# Patient Record
Sex: MALE | Race: WHITE | NOT HISPANIC OR LATINO | Employment: FULL TIME | URBAN - METROPOLITAN AREA
[De-identification: names, ages, dates, MRNs, and addresses within clinical notes are randomized per-mention and may not be internally consistent; named-entity substitution may affect disease eponyms.]

---

## 2017-01-17 ENCOUNTER — ALLSCRIPTS OFFICE VISIT (OUTPATIENT)
Dept: OTHER | Facility: OTHER | Age: 57
End: 2017-01-17

## 2017-03-27 ENCOUNTER — ALLSCRIPTS OFFICE VISIT (OUTPATIENT)
Dept: OTHER | Facility: OTHER | Age: 57
End: 2017-03-27

## 2017-03-31 ENCOUNTER — GENERIC CONVERSION - ENCOUNTER (OUTPATIENT)
Dept: OTHER | Facility: OTHER | Age: 57
End: 2017-03-31

## 2017-03-31 LAB
CHOLEST SERPL-MCNC: 172 MG/DL
GLUCOSE SERPL-MCNC: 81 MG/DL
HBA1C MFR BLD HPLC: 5.4 %
HDLC SERPL-MCNC: 57 MG/DL
LDLC SERPL CALC-MCNC: 103 MG/DL
TRIGL SERPL-MCNC: 60 MG/DL

## 2017-04-05 ENCOUNTER — ALLSCRIPTS OFFICE VISIT (OUTPATIENT)
Dept: OTHER | Facility: OTHER | Age: 57
End: 2017-04-05

## 2017-09-25 ENCOUNTER — ALLSCRIPTS OFFICE VISIT (OUTPATIENT)
Dept: OTHER | Facility: OTHER | Age: 57
End: 2017-09-25

## 2018-01-11 NOTE — PROGRESS NOTES
Assessment   1  Encounter for preventive health examination (V70 0) (Z00 00)  2  Adult BMI 25 0-25 9 kg/sq m (V85 21) (Z68 25)1      1 Amended By: Giovanna Myers; Jan 17 2017 9:20 AM EST    Plan  Health Maintenance    · Follow-up visit in 1 year Evaluation and Treatment  Follow-up  Status: Hold For -  Scheduling  Requested for: [id number]   · Always use a seat belt and shoulder strap when riding or driving a motor vehicle ;  Status:Complete;   Done: 86MAH5278 09:05AM   · Use a sun block product with an SPF of 15 or more ; Status:Complete;   Done:  70SNT0282 09:05AM   · We recommend that you follow the "Mediterranean diet "; Status:Complete;   Done:  92MEM5623 09:05AM    Discussion/Summary  Impression: health maintenance visit  Currently, he eats a healthy diet  Prostate cancer screening: prostate cancer screening is current  Testicular cancer screening: monthly self testicular exam was advised  Colorectal cancer screening: colorectal cancer screening is current  Screening lab work includes glucose and lipid profile  The immunizations are up to date  Advice and education were given regarding nutrition, aerobic exercise, weight bearing exercise, sunscreen use and seat belt use  Chief Complaint  Patient presents for CPE (dli)      History of Present Illness  HM, Adult Male: The patient is being seen for a health maintenance evaluation  The last health maintenance visit was 12 month(s) ago  Social History: Household members include spouse and adult children  He is   Work status: working full time  The patient has never smoked cigarettes  He reports rare alcohol use  He has never used illicit drugs  General Health: The patient's health since the last visit is described as good  He has regular dental visits  He denies vision problems  He denies hearing loss  Immunizations status: up to date  Lifestyle:  He consumes a diverse and healthy diet  He does not have any weight concerns  He exercises regularly  He does not use tobacco  He denies drug use  Screening: Prostate cancer screening includes prostate-specific antigen testing last year  Testicular cancer screening includes monthly self testicular examinations  Colorectal cancer screening includes a colonoscopy within the past ten years  Metabolic screening includes lipid profile performed within the past five years, glucose screening performed last year and thyroid function test performed last year  Cardiovascular risk factors: no hypertension, no diabetes, no high LDL cholesterol, no low HDL cholesterol, no stress, no obesity, no tobacco use, no illicit drug use and no sedentary lifestyle  General health risks: no elevated prostate-specific antigen, no undescended testis, no previous colon polyps, no inflammatory bowel disease, no osteoporosis risk factors, no asbestos exposure and no radiation exposure  Safety elements used: seat belt, smoke detector and carbon monoxide detector  Review of Systems    Constitutional: No fever or chills, feels well, no tiredness, no recent weight gain or weight loss  Eyes: No complaints of eye pain, no red eyes, no discharge from eyes, no itchy eyes  ENT: no complaints of earache, no hearing loss, no nosebleeds, no nasal discharge, no sore throat, no hoarseness  Cardiovascular: No complaints of slow heart rate, no fast heart rate, no chest pain, no palpitations, no leg claudication, no lower extremity  Respiratory: No complaints of shortness of breath, no wheezing, no cough, no SOB on exertion, no orthopnea or PND  Gastrointestinal: No complaints of abdominal pain, no constipation, no nausea or vomiting, no diarrhea or bloody stools  Genitourinary: No complaints of dysuria, no incontinence, no hesitancy, no nocturia, no genital lesion, no testicular pain  Musculoskeletal: No complaints of arthralgia, no myalgias, no joint swelling or stiffness, no limb pain or swelling     Integumentary: No complaints of skin rash or skin lesions, no itching, no skin wound, no dry skin  Neurological: No compliants of headache, no confusion, no convulsions, no numbness or tingling, no dizziness or fainting, no limb weakness, no difficulty walking  Psychiatric: Is not suicidal, no sleep disturbances, no anxiety or depression, no change in personality, no emotional problems  Endocrine: No complaints of proptosis, no hot flashes, no muscle weakness, no erectile dysfunction, no deepening of the voice, no feelings of weakness  Hematologic/Lymphatic: No complaints of swollen glands, no swollen glands in the neck, does not bleed easily, no easy bruising  Active Problems   1  Abdominal pain (789 00) (R10 9)  2  Cyanocobalamin deficiency (266 2) (E53 8)  3  Diabetes (250 00) (E11 9)  4  Diabetes mellitus, type II (250 00) (E11 9)  5  Hyperlipidemia (272 4) (E78 5)  6  Hypertension (401 9) (I10)  7  Obesity (278 00) (E66 9)  8  Pernicious anemia (281 0) (D51 0)  9   Renal calcification (593 89) (N28 89)    Past Medical History    · History of Cholelithiasis (574 20) (K80 20)   · History of Hematuria (599 70) (R31 9)   · History of Renal colic (280 5) (M71)    Surgical History    · History of Arthrotomy Of Knee With Medial Meniscectomy   · History of Gallbladder Surgery   · History of Partial Colectomy   · History of Renal Lithotripsy    Family History  Mother    · Family history of HTN (hypertension)   · Family history of Hyperlipidemia   · Family history of Hypothyroid  Father    · No pertinent family history  Maternal Grandmother    · FHx: mental illness (V17 0) (Z81 8)  Paternal Grandmother    · Family history of alcoholism (V17 0) (Z81 1)   · Family history of Nervous breakdown  Paternal Grandfather    · Family history of alcoholism (V17 0) (Z81 1)  Aunt    · Family history of alcoholism (V17 0) (Z81 1)  Other    · Family history of alcoholism (V17 0) (Z81 1)    Social History    · Alcohol use   · one drink every two weeks · Caffeine use (V49 89) (F15 90)   · Dental care, regularly   · Never a smoker    Current Meds  1  Daily Multivitamin TABS; Therapy: (Recorded:86Vio9723) to Recorded    Allergies   1  Nuts    Vitals   Recorded: 82DJS1532 08:29AM   Temperature 97 2 F   Heart Rate 66   Respiration 18   Systolic 367   Diastolic 80   Height 6 ft 1 in   Weight 197 lb    BMI Calculated 25 99   BSA Calculated 2 14     Physical Exam    Constitutional   General appearance: No acute distress, well appearing and well nourished  Eyes   Conjunctiva and lids: No erythema, swelling or discharge  Pupils and irises: Equal, round, reactive to light  Ophthalmoscopic examination: Normal fundi and optic discs  Ears, Nose, Mouth, and Throat   External inspection of ears and nose: Normal     Otoscopic examination: Tympanic membranes translucent with normal light reflex  Canals patent without erythema  Hearing: Normal     Nasal mucosa, septum, and turbinates: Normal without edema or erythema  Lips, teeth, and gums: Normal, good dentition  Oropharynx: Normal with no erythema, edema, exudate or lesions  Neck   Neck: Supple, symmetric, trachea midline, no masses  Thyroid: Normal, no thyromegaly  Pulmonary   Respiratory effort: No increased work of breathing or signs of respiratory distress  Auscultation of lungs: Clear to auscultation  Cardiovascular   Auscultation of heart: Normal rate and rhythm, normal S1 and S2, no murmurs  Carotid pulses: 2+ bilaterally  Abdominal aorta: Normal     Examination of extremities for edema and/or varicosities: Normal     Abdomen   Abdomen: Non-tender, no masses  Liver and spleen: No hepatomegaly or splenomegaly  Lymphatic   Palpation of lymph nodes in neck: No lymphadenopathy  Musculoskeletal   Gait and station: Normal     Inspection/palpation of digits and nails: Normal without clubbing or cyanosis      Inspection/palpation of joints, bones, and muscles: Normal     Range of motion: Normal     Stability: Normal     Muscle strength/tone: Normal     Skin   Skin and subcutaneous tissue: Normal without rashes or lesions  Palpation of skin and subcutaneous tissue: Normal turgor  Neurologic   Cranial nerves: Cranial nerves 2-12 intact  Reflexes: 2+ and symmetric  Sensation: No sensory loss  Psychiatric   Judgment and insight: Normal     Orientation to person, place and time: Normal     Recent and remote memory: Intact  Mood and affect: Normal        Results/Data  PHQ-2 Adult Depression Screening 95SGA3648 08:37AM User, s     Test Name Result Flag Reference   PHQ-2 Adult Depression Score 0     Over the last two weeks, how often have you been bothered by any of the following problems?   Little interest or pleasure in doing things: Not at all - 0  Feeling down, depressed, or hopeless: Not at all - 0   PHQ-2 Adult Depression Screening Negative         Signatures   Electronically signed by : Garcia Velasco MD; Jan 17 2017  9:21AM EST                       (Author)

## 2018-01-12 VITALS
HEIGHT: 73 IN | SYSTOLIC BLOOD PRESSURE: 118 MMHG | RESPIRATION RATE: 18 BRPM | WEIGHT: 197 LBS | DIASTOLIC BLOOD PRESSURE: 80 MMHG | BODY MASS INDEX: 26.11 KG/M2 | TEMPERATURE: 97.2 F | HEART RATE: 66 BPM

## 2018-01-13 VITALS
WEIGHT: 200 LBS | SYSTOLIC BLOOD PRESSURE: 140 MMHG | BODY MASS INDEX: 26.51 KG/M2 | RESPIRATION RATE: 18 BRPM | TEMPERATURE: 96.8 F | DIASTOLIC BLOOD PRESSURE: 80 MMHG | HEIGHT: 73 IN | OXYGEN SATURATION: 98 % | HEART RATE: 60 BPM

## 2018-01-13 VITALS
HEART RATE: 68 BPM | WEIGHT: 207 LBS | HEIGHT: 72 IN | RESPIRATION RATE: 16 BRPM | DIASTOLIC BLOOD PRESSURE: 82 MMHG | TEMPERATURE: 97.1 F | SYSTOLIC BLOOD PRESSURE: 120 MMHG | BODY MASS INDEX: 28.04 KG/M2

## 2018-01-14 VITALS
DIASTOLIC BLOOD PRESSURE: 98 MMHG | RESPIRATION RATE: 16 BRPM | WEIGHT: 200 LBS | SYSTOLIC BLOOD PRESSURE: 162 MMHG | BODY MASS INDEX: 27.09 KG/M2 | HEART RATE: 72 BPM | TEMPERATURE: 97.2 F | HEIGHT: 72 IN

## 2018-01-23 ENCOUNTER — ALLSCRIPTS OFFICE VISIT (OUTPATIENT)
Dept: OTHER | Facility: OTHER | Age: 58
End: 2018-01-23

## 2018-01-23 LAB — OCCULT BLD, FECAL IMMUNOLOGICAL (HISTORICAL): NEGATIVE

## 2018-01-24 NOTE — PROGRESS NOTES
Assessment    1  Encounter for preventive health examination (V70 0) (Z00 00)    Plan  Cold intolerance, Health Maintenance    · (1) TSH; Status:Active; Requested [id number];   Encounter for prostate cancer screening    · (1) PSA (SCREEN) (Dx V76 44 Screen for Prostate Cancer); Status:Active; Requested  LVZ:[id number]; Health Maintenance    · Follow-up visit in 1 year Evaluation and Treatment  Follow-up  Status: Hold For -  Scheduling  Requested for: 42BLL2294   · Always use a seat belt and shoulder strap when riding or driving a motor vehicle ;  Status:Complete;   Done: 45CJK0775   · Use a sun block product with an SPF of 15 or more ; Status:Complete;   Done:  02CPE4162   · We recommend that you follow the "Mediterranean diet "; Status:Complete;   Done:  31APC2741   · (1) COMPREHENSIVE METABOLIC PANEL; Status:Active; Requested VMD:[id number];    · (1) VITAMIN D 25-HYDROXY; Status:Active; Requested EWT:[id number]; Health Maintenance, Hypertension    · (1) LIPID PANEL, FASTING; Status:Active; Requested ISO:[id number]; Health Maintenance, Hypertension, Pernicious anemia    · (1) CBC/PLT/DIFF; Status:Active; Requested VNY:[id number]; Pernicious anemia    · (1) VITAMIN B12; Status:Active; Requested ZEYAD:[id number]; Discussion/Summary  health maintenance visit Currently, he eats a healthy diet  Prostate cancer screening: PSA was ordered  Testicular cancer screening: monthly self testicular exam was advised  Colorectal cancer screening: colorectal cancer screening is current  Screening lab work includes glucose, 25-hydroxyvitamin D and lipid profile  The immunizations are up to date  Advice and education were given regarding nutrition, aerobic exercise, seat belt use and sunscreen use  Chief Complaint  Patient is here today for his annual physical exam, L  Nazario/LPMOLINA      History of Present Illness  HM, Adult Male: The patient is being seen for a health maintenance evaluation   The last health maintenance visit was 12 month(s) ago  Social History: Household members include spouse  He is   Work status: working full time  The patient has never smoked cigarettes  He reports rare alcohol use  He has never used illicit drugs  General Health:   Screening:      Review of Systems    Constitutional: No fever or chills, feels well, no tiredness, no recent weight gain or weight loss  Eyes: No complaints of eye pain, no red eyes, no discharge from eyes, no itchy eyes  ENT: no complaints of earache, no hearing loss, no nosebleeds, no nasal discharge, no sore throat, no hoarseness  Cardiovascular: No complaints of slow heart rate, no fast heart rate, no chest pain, no palpitations, no leg claudication, no lower extremity  Respiratory: No complaints of shortness of breath, no wheezing, no cough, no SOB on exertion, no orthopnea or PND  Gastrointestinal: No complaints of abdominal pain, no constipation, no nausea or vomiting, no diarrhea or bloody stools  Genitourinary: No complaints of dysuria, no incontinence, no hesitancy, no nocturia, no genital lesion, no testicular pain  Musculoskeletal: No complaints of arthralgia, no myalgias, no joint swelling or stiffness, no limb pain or swelling  Integumentary: No complaints of skin rash or skin lesions, no itching, no skin wound, no dry skin  Neurological: No compliants of headache, no confusion, no convulsions, no numbness or tingling, no dizziness or fainting, no limb weakness, no difficulty walking  Psychiatric: Is not suicidal, no sleep disturbances, no anxiety or depression, no change in personality, no emotional problems  Endocrine: No complaints of proptosis, no hot flashes, no muscle weakness, no erectile dysfunction, no deepening of the voice, no feelings of weakness  Hematologic/Lymphatic: No complaints of swollen glands, no swollen glands in the neck, does not bleed easily, no easy bruising  Active Problems    1   Adult BMI 25 0-25 9 kg/sq m (V85 21) (Z68 25)   2  Adult BMI 26 0-26 9 kg/sq m (V85 22) (Z68 26)   3  Encounter for prostate cancer screening (V76 44) (Z12 5)   4  History of type 2 diabetes mellitus (V12 29) (Z86 39)   5  History of weight loss surgery (V45 86) (Z98 84)   6  Hypertension (401 9) (I10)   7  Pernicious anemia (281 0) (D51 0)   8  Renal calcification (593 89) (N28 89)    Past Medical History    · History of Cholelithiasis (574 20) (K80 20)   · History of Hematuria (599 70) (R31 9)   · History of obesity (V12 29) (Z86 39)   · History of Renal colic (711 0) (S40)    Surgical History    · History of Arthrotomy Of Knee With Medial Meniscectomy   · History of Gallbladder Surgery   · History of Partial Colectomy   · History of Renal Lithotripsy    Family History  Mother    · Family history of HTN (hypertension)   · Family history of Hyperlipidemia   · Family history of Hypothyroid  Father    · No pertinent family history  Maternal Grandmother    · FHx: mental illness (V17 0) (Z81 8)  Paternal Grandmother    · Family history of alcoholism (V17 0) (Z81 1)   · Family history of Nervous breakdown  Paternal Grandfather    · Family history of alcoholism (V17 0) (Z81 1)  Aunt    · Family history of alcoholism (V17 0) (Z81 1)  Other    · Family history of alcoholism (V17 0) (Z81 1)    Social History    · Active advance directive (V49 89) (Z78 9)   · Alcohol use   · one drink every two weeks   · Dental care, regularly   · Never a smoker   · No caffeine use   · Occasional alcohol use    Current Meds   1  Candesartan Cilexetil-HCTZ 32-12 5 MG Oral Tablet; Take 1 tablet daily; Therapy: 40ARB9451 to (Evaluate:18Jun2018)  Requested for: 23Jun2017; Last   Rx:23Jun2017 Ordered   2  Daily Multivitamin TABS; Therapy: (Recorded:17Jan2017) to Recorded    Allergies    1   Nuts    Vitals   Recorded: 28DBV7197 09:05AM   Temperature 97 8 F, Temporal   Heart Rate 74, L Radial   Pulse Quality Normal, L Radial   Respiration 16   Systolic 747, LUE, Sitting Diastolic 70, LUE, Sitting   Height 6 ft 1 in   Weight 215 lb    BMI Calculated 28 37   BSA Calculated 2 22   O2 Saturation 97     Physical Exam    Constitutional   General appearance: No acute distress, well appearing and well nourished  Eyes   Conjunctiva and lids: No erythema, swelling or discharge  Pupils and irises: Equal, round, reactive to light  Ophthalmoscopic examination: Normal fundi and optic discs  Ears, Nose, Mouth, and Throat   External inspection of ears and nose: Normal     Otoscopic examination: Tympanic membranes translucent with normal light reflex  Canals patent without erythema  Hearing: Normal     Nasal mucosa, septum, and turbinates: Normal without edema or erythema  Lips, teeth, and gums: Normal, good dentition  Oropharynx: Normal with no erythema, edema, exudate or lesions  Neck   Neck: Supple, symmetric, trachea midline, no masses  Thyroid: Normal, no thyromegaly  Pulmonary   Respiratory effort: No increased work of breathing or signs of respiratory distress  Auscultation of lungs: Clear to auscultation  Cardiovascular   Auscultation of heart: Normal rate and rhythm, normal S1 and S2, no murmurs  Carotid pulses: 2+ bilaterally  Abdominal aorta: Normal     Examination of extremities for edema and/or varicosities: Normal     Abdomen   Abdomen: Non-tender, no masses  Liver and spleen: No hepatomegaly or splenomegaly  Examination for hernias: No hernias appreciated  Anus, perineum, and rectum: Normal sphincter tone, no masses, no prolapse  Stool sample for occult blood: Negative  Genitourinary   Scrotal contents: Normal testes, no masses  Penis: Normal, no lesions  Digital rectal exam of prostate: Normal size, no masses  Lymphatic   Palpation of lymph nodes in neck: No lymphadenopathy  Musculoskeletal   Gait and station: Normal     Inspection/palpation of digits and nails: Normal without clubbing or cyanosis  Inspection/palpation of joints, bones, and muscles: Normal     Range of motion: Normal     Stability: Normal     Muscle strength/tone: Normal     Neurologic   Cranial nerves: Cranial nerves 2-12 intact  Reflexes: 2+ and symmetric  Sensation: No sensory loss  Psychiatric   Judgment and insight: Normal     Orientation to person, place and time: Normal     Recent and remote memory: Intact  Mood and affect: Normal        Results/Data  PHQ-2 Adult Depression Screening 23Jan2018 09:10AM User, s     Test Name Result Flag Reference   PHQ-2 Adult Depression Score 0     Over the last two weeks, how often have you been bothered by any of the following problems?   Little interest or pleasure in doing things: Not at all - 0  Feeling down, depressed, or hopeless: Not at all - 0   PHQ-2 Adult Depression Screening Negative         Signatures   Electronically signed by : Ladi Andres MD; Jan 23 2018  9:46AM EST                       (Author)

## 2018-02-05 ENCOUNTER — OFFICE VISIT (OUTPATIENT)
Dept: FAMILY MEDICINE CLINIC | Facility: CLINIC | Age: 58
End: 2018-02-05
Payer: COMMERCIAL

## 2018-02-05 VITALS
HEIGHT: 72 IN | TEMPERATURE: 97.5 F | BODY MASS INDEX: 29.39 KG/M2 | OXYGEN SATURATION: 98 % | RESPIRATION RATE: 16 BRPM | HEART RATE: 70 BPM | WEIGHT: 217 LBS | SYSTOLIC BLOOD PRESSURE: 140 MMHG | DIASTOLIC BLOOD PRESSURE: 72 MMHG

## 2018-02-05 DIAGNOSIS — K57.32 DIVERTICULITIS OF LARGE INTESTINE WITHOUT PERFORATION OR ABSCESS WITHOUT BLEEDING: Primary | ICD-10-CM

## 2018-02-05 LAB
SL AMB  POCT GLUCOSE, UA: NORMAL
SL AMB LEUKOCYTE ESTERASE,UA: 25
SL AMB POCT BILIRUBIN,UA: NEGATIVE
SL AMB POCT BLOOD,UA: NEGATIVE
SL AMB POCT CLARITY,UA: CLEAR
SL AMB POCT COLOR,UA: YELLOW
SL AMB POCT KETONES,UA: NEGATIVE
SL AMB POCT NITRITE,UA: NEGATIVE
SL AMB POCT PH,UA: 6
SL AMB POCT SPECIFIC GRAVITY,UA: 1.02
SL AMB POCT URINE PROTEIN: NEGATIVE
SL AMB POCT UROBILINOGEN: NORMAL

## 2018-02-05 PROCEDURE — 99213 OFFICE O/P EST LOW 20 MIN: CPT | Performed by: FAMILY MEDICINE

## 2018-02-05 PROCEDURE — 87086 URINE CULTURE/COLONY COUNT: CPT | Performed by: FAMILY MEDICINE

## 2018-02-05 PROCEDURE — 81003 URINALYSIS AUTO W/O SCOPE: CPT | Performed by: FAMILY MEDICINE

## 2018-02-05 RX ORDER — CANDESARTAN CILEXETIL AND HYDROCHLOROTHIAZIDE 32; 12.5 MG/1; MG/1
1 TABLET ORAL DAILY
COMMUNITY
Start: 2016-02-09 | End: 2018-06-07 | Stop reason: SDUPTHER

## 2018-02-05 RX ORDER — AMOXICILLIN AND CLAVULANATE POTASSIUM 500; 125 MG/1; MG/1
1 TABLET, FILM COATED ORAL
Qty: 20 TABLET | Refills: 0 | Status: SHIPPED | OUTPATIENT
Start: 2018-02-05 | End: 2018-02-15

## 2018-02-05 RX ORDER — ERGOCALCIFEROL (VITAMIN D2) 10 MCG
TABLET ORAL DAILY
COMMUNITY
End: 2020-06-15 | Stop reason: ALTCHOICE

## 2018-02-05 NOTE — PROGRESS NOTES
Assessment/Plan:  Diverticulitis  Augmentin 500-125 one daily for 10 days  Clear liquid diet  CT scan abdomen and pelvis  Call immediately for change in symptomatology  Follow-up 2 days      Subjective:     Patient ID: Yoandy Gaspar is a 61 y o  male  This is a 76-year-old male presenting with left lower quadrant pain  The patient has a history of recurrent renal calculi and diverticulitis  No fever  No nausea or vomiting  Review of Systems   Constitutional: Negative  Respiratory: Negative  Cardiovascular: Negative  Gastrointestinal: Positive for abdominal pain and constipation  Negative for abdominal distention, anal bleeding, blood in stool, diarrhea, nausea, rectal pain and vomiting  Genitourinary: Positive for flank pain  Objective:     Physical Exam   Constitutional: He appears well-developed and well-nourished  Cardiovascular: Normal rate, regular rhythm and normal heart sounds  Exam reveals no gallop and no friction rub  No murmur heard  Pulmonary/Chest: Effort normal and breath sounds normal  No respiratory distress  He has no wheezes  He has no rales  He exhibits no tenderness  Abdominal: Soft  Bowel sounds are normal  He exhibits no distension and no mass  Tenderness: Left lower quadrant tenderness  There is no rebound and no guarding

## 2018-02-07 LAB — BACTERIA UR CULT: NORMAL

## 2018-02-09 ENCOUNTER — OFFICE VISIT (OUTPATIENT)
Dept: FAMILY MEDICINE CLINIC | Facility: CLINIC | Age: 58
End: 2018-02-09
Payer: COMMERCIAL

## 2018-02-09 VITALS
SYSTOLIC BLOOD PRESSURE: 152 MMHG | BODY MASS INDEX: 28.15 KG/M2 | OXYGEN SATURATION: 97 % | RESPIRATION RATE: 18 BRPM | HEART RATE: 69 BPM | DIASTOLIC BLOOD PRESSURE: 102 MMHG | HEIGHT: 73 IN | TEMPERATURE: 97.8 F | WEIGHT: 212.4 LBS

## 2018-02-09 DIAGNOSIS — N20.0 RENAL CALCULUS: Primary | ICD-10-CM

## 2018-02-09 PROBLEM — K66.0 ABDOMINAL ADHESIONS: Status: ACTIVE | Noted: 2018-02-09

## 2018-02-09 PROBLEM — K57.30 DIVERTICULOSIS OF LARGE INTESTINE: Status: ACTIVE | Noted: 2018-02-09

## 2018-02-09 PROCEDURE — 3008F BODY MASS INDEX DOCD: CPT | Performed by: FAMILY MEDICINE

## 2018-02-09 PROCEDURE — 99213 OFFICE O/P EST LOW 20 MIN: CPT | Performed by: FAMILY MEDICINE

## 2018-02-09 NOTE — PROGRESS NOTES
Assessment/Plan:  All probable renal calculus  Advance diet  Fluids  Call for fever or worsening abdominal pain  Nausea or vomiting  Will review recent KUB  Subjective:     Patient ID: Harinder Herrera is a 61 y o  male  This is a 59-year-old gentleman with a long history of passing renal calculi  He presents for follow-up of left lower quadrant pain which he states is behaving more like a renal calculus  Pain radiates from the pelvis to the left flank  Yesterday he completed a KUB  Review of Systems   Gastrointestinal: Negative for abdominal distention, anal bleeding, blood in stool, constipation, diarrhea, nausea, rectal pain and vomiting  Abdominal pain: PelvicPain  Genitourinary: Positive for flank pain  Negative for decreased urine volume, difficulty urinating, discharge, dysuria, enuresis, frequency, genital sores, hematuria, penile pain, penile swelling, scrotal swelling, testicular pain and urgency  Objective:     Physical Exam   Constitutional: He appears well-developed and well-nourished  HENT:   Head: Normocephalic and atraumatic  Abdominal: Soft  Bowel sounds are normal  He exhibits no distension and no mass  Tenderness: Mild tenderness left lower quadrant which seems to radiate to the left flank  There is no rebound and no guarding

## 2018-02-09 NOTE — PATIENT INSTRUCTIONS
Flank Pain   WHAT YOU NEED TO KNOW:   Flank pain is felt in the area below your ribcage and above your hip bones, often in the lower back  Your pain may be dull or so severe that you cannot get comfortable  The pain may stay in one area or radiate to another area  It may worsen and lighten in waves  Flank pain is often a sign of problems with your urinary tract, such as a kidney stone or infection  DISCHARGE INSTRUCTIONS:   Return to the emergency department if:   · You have a fever  · Your heart is fluttering or jumping  · You see blood in your urine  · Your pain radiates into your lower abdomen and genital area  · You have intense pain in your low back next to your spine  · You are much more tired than usual and have no desire to eat  · You have a headache and your muscles jerk  Contact your healthcare provider if:   · You have an upset stomach and are vomiting  · You have to urinate more often, and with urgency  · Your pain worsens or does not improve, and you cannot get comfortable  · You pass a stone when you urinate  · You have questions or concerns about your condition or care  Medicines: The following medicines may be ordered for you:  · Pain medicine  may help decrease or relieve your pain  Do not wait until the pain is severe before you take your medicine  · Antibiotics  may help treat a urinary tract infection caused by bacteria  · Take your medicine as directed  Contact your healthcare provider if you think your medicine is not helping or if you have side effects  Tell him of her if you are allergic to any medicine  Keep a list of the medicines, vitamins, and herbs you take  Include the amounts, and when and why you take them  Bring the list or the pill bottles to follow-up visits  Carry your medicine list with you in case of an emergency    Follow up with your healthcare provider in 1 to 2 days or as directed:  Write down your questions so you remember to ask them during your visits  © 2017 [employer] [employer] Information is for End User's use only and may not be sold, redistributed or otherwise used for commercial purposes  All illustrations and images included in CareNotes® are the copyrighted property of [employer]  or [address]  The above information is an  only  It is not intended as medical advice for individual conditions or treatments  Talk to your doctor, nurse or pharmacist before following any medical regimen to see if it is safe and effective for you

## 2018-03-14 LAB
25(OH)D3+25(OH)D2 SERPL-MCNC: 32.2 NG/ML (ref 30–100)
ALBUMIN SERPL-MCNC: 5.1 G/DL (ref 3.5–5.5)
ALBUMIN/GLOB SERPL: 1.9 {RATIO} (ref 1.2–2.2)
ALP SERPL-CCNC: 77 IU/L (ref 39–117)
ALT SERPL-CCNC: 16 IU/L (ref 0–44)
AST SERPL-CCNC: 17 IU/L (ref 0–40)
BASOPHILS # BLD AUTO: 0 X10E3/UL (ref 0–0.2)
BASOPHILS NFR BLD AUTO: 1 %
BILIRUB SERPL-MCNC: 1.9 MG/DL (ref 0–1.2)
BUN SERPL-MCNC: 13 MG/DL (ref 6–24)
BUN/CREAT SERPL: 13 (ref 9–20)
CALCIUM SERPL-MCNC: 9.8 MG/DL (ref 8.7–10.2)
CHLORIDE SERPL-SCNC: 96 MMOL/L (ref 96–106)
CHOLEST SERPL-MCNC: 248 MG/DL (ref 100–199)
CHOLEST/HDLC SERPL: 5 RATIO UNITS (ref 0–5)
CO2 SERPL-SCNC: 29 MMOL/L (ref 18–29)
CREAT SERPL-MCNC: 0.98 MG/DL (ref 0.76–1.27)
EOSINOPHIL # BLD AUTO: 0.3 X10E3/UL (ref 0–0.4)
EOSINOPHIL NFR BLD AUTO: 5 %
ERYTHROCYTE [DISTWIDTH] IN BLOOD BY AUTOMATED COUNT: 13.9 % (ref 12.3–15.4)
GLOBULIN SER-MCNC: 2.7 G/DL (ref 1.5–4.5)
GLUCOSE SERPL-MCNC: 93 MG/DL (ref 65–99)
HCT VFR BLD AUTO: 52.3 % (ref 37.5–51)
HDLC SERPL-MCNC: 50 MG/DL
HGB BLD-MCNC: 17.4 G/DL (ref 13–17.7)
IMM GRANULOCYTES # BLD: 0 X10E3/UL (ref 0–0.1)
IMM GRANULOCYTES NFR BLD: 0 %
LDLC SERPL CALC-MCNC: 165 MG/DL (ref 0–99)
LYMPHOCYTES # BLD AUTO: 1.9 X10E3/UL (ref 0.7–3.1)
LYMPHOCYTES NFR BLD AUTO: 36 %
MCH RBC QN AUTO: 30.4 PG (ref 26.6–33)
MCHC RBC AUTO-ENTMCNC: 33.3 G/DL (ref 31.5–35.7)
MCV RBC AUTO: 91 FL (ref 79–97)
MONOCYTES # BLD AUTO: 0.5 X10E3/UL (ref 0.1–0.9)
MONOCYTES NFR BLD AUTO: 10 %
NEUTROPHILS # BLD AUTO: 2.5 X10E3/UL (ref 1.4–7)
NEUTROPHILS NFR BLD AUTO: 48 %
PLATELET # BLD AUTO: 180 X10E3/UL (ref 150–379)
POTASSIUM SERPL-SCNC: 4.1 MMOL/L (ref 3.5–5.2)
PROT SERPL-MCNC: 7.8 G/DL (ref 6–8.5)
PSA SERPL-MCNC: 1.6 NG/ML (ref 0–4)
RBC # BLD AUTO: 5.73 X10E6/UL (ref 4.14–5.8)
SL AMB EGFR AFRICAN AMERICAN: 98 ML/MIN/1.73
SL AMB EGFR NON AFRICAN AMERICAN: 85 ML/MIN/1.73
SL AMB VLDL CHOLESTEROL CALC: 33 MG/DL (ref 5–40)
SODIUM SERPL-SCNC: 140 MMOL/L (ref 134–144)
TRIGL SERPL-MCNC: 163 MG/DL (ref 0–149)
TSH SERPL DL<=0.005 MIU/L-ACNC: 0.89 UIU/ML (ref 0.45–4.5)
VIT B12 SERPL-MCNC: 473 PG/ML (ref 232–1245)
WBC # BLD AUTO: 5.2 X10E3/UL (ref 3.4–10.8)

## 2018-06-07 DIAGNOSIS — I10 ESSENTIAL HYPERTENSION: Primary | ICD-10-CM

## 2018-06-07 RX ORDER — CANDESARTAN CILEXETIL AND HYDROCHLOROTHIAZIDE 32; 12.5 MG/1; MG/1
TABLET ORAL
Qty: 90 TABLET | Refills: 3 | Status: SHIPPED | OUTPATIENT
Start: 2018-06-07 | End: 2019-05-23 | Stop reason: SDUPTHER

## 2019-01-29 ENCOUNTER — OFFICE VISIT (OUTPATIENT)
Dept: FAMILY MEDICINE CLINIC | Facility: CLINIC | Age: 59
End: 2019-01-29
Payer: COMMERCIAL

## 2019-01-29 VITALS
BODY MASS INDEX: 29.82 KG/M2 | OXYGEN SATURATION: 98 % | WEIGHT: 225 LBS | RESPIRATION RATE: 16 BRPM | HEIGHT: 73 IN | SYSTOLIC BLOOD PRESSURE: 120 MMHG | DIASTOLIC BLOOD PRESSURE: 68 MMHG | TEMPERATURE: 97.8 F | HEART RATE: 80 BPM

## 2019-01-29 DIAGNOSIS — F43.22 ADJUSTMENT REACTION WITH ANXIETY: Primary | ICD-10-CM

## 2019-01-29 LAB
SL AMB  POCT GLUCOSE, UA: 0
SL AMB LEUKOCYTE ESTERASE,UA: 0
SL AMB POCT BILIRUBIN,UA: 0
SL AMB POCT BLOOD,UA: 0
SL AMB POCT CLARITY,UA: CLEAR
SL AMB POCT COLOR,UA: YELLOW
SL AMB POCT KETONES,UA: 0
SL AMB POCT NITRITE,UA: 0
SL AMB POCT PH,UA: 7
SL AMB POCT SPECIFIC GRAVITY,UA: 1.01
SL AMB POCT URINE PROTEIN: 0
SL AMB POCT UROBILINOGEN: 0

## 2019-01-29 PROCEDURE — 81003 URINALYSIS AUTO W/O SCOPE: CPT | Performed by: FAMILY MEDICINE

## 2019-01-29 PROCEDURE — 99214 OFFICE O/P EST MOD 30 MIN: CPT | Performed by: FAMILY MEDICINE

## 2019-01-29 NOTE — PROGRESS NOTES
Assessment/Plan:  Adjustment reaction anxiety            Extensive discussion for 45 min regarding stress related issues  Most of the stress is related to the patient's son who is drinking  The son has had DUIs  The patient's son is seeing a therapist however he may be some better served seeing a psychiatrist as there may be an undiagnosed psychiatric illness  The patient was given the name of the psychiatrist with the recommendation for some to make an appointment  He will reschedule is CPX  The patient also needs to follow up with his urologist      Diagnoses and all orders for this visit:    Annual physical exam  -     POCT urine dip auto non-scope          Subjective:     Patient ID: Oj Rai is a 60 y o  male  This is a 65-year-old gentleman who who presents to discuss stress related issues  Review of Systems   Constitutional: Negative  Respiratory: Negative  Cardiovascular: Negative  Neurological: Negative  Objective:     Physical Exam   Constitutional: He is oriented to person, place, and time  He appears well-developed and well-nourished  HENT:   Head: Normocephalic and atraumatic  Pulmonary/Chest: Effort normal    Neurological: He is alert and oriented to person, place, and time  No cranial nerve deficit   Coordination normal

## 2019-02-01 ENCOUNTER — TRANSCRIBE ORDERS (OUTPATIENT)
Dept: ADMINISTRATIVE | Facility: HOSPITAL | Age: 59
End: 2019-02-01

## 2019-02-01 DIAGNOSIS — N50.811 TESTICULAR PAIN, RIGHT: Primary | ICD-10-CM

## 2019-02-01 DIAGNOSIS — N20.0 URIC ACID NEPHROLITHIASIS: ICD-10-CM

## 2019-02-07 ENCOUNTER — HOSPITAL ENCOUNTER (OUTPATIENT)
Dept: RADIOLOGY | Facility: HOSPITAL | Age: 59
Discharge: HOME | End: 2019-02-07
Attending: SPECIALIST
Payer: COMMERCIAL

## 2019-02-07 DIAGNOSIS — N50.811 TESTICULAR PAIN, RIGHT: ICD-10-CM

## 2019-02-07 DIAGNOSIS — N20.0 URIC ACID NEPHROLITHIASIS: ICD-10-CM

## 2019-02-07 PROCEDURE — 76870 US EXAM SCROTUM: CPT

## 2019-02-07 PROCEDURE — 74176 CT ABD & PELVIS W/O CONTRAST: CPT

## 2019-02-08 ENCOUNTER — OFFICE VISIT (OUTPATIENT)
Dept: FAMILY MEDICINE CLINIC | Facility: CLINIC | Age: 59
End: 2019-02-08
Payer: COMMERCIAL

## 2019-02-08 VITALS
SYSTOLIC BLOOD PRESSURE: 122 MMHG | HEIGHT: 73 IN | RESPIRATION RATE: 12 BRPM | WEIGHT: 222.6 LBS | DIASTOLIC BLOOD PRESSURE: 80 MMHG | BODY MASS INDEX: 29.5 KG/M2 | OXYGEN SATURATION: 97 % | HEART RATE: 82 BPM | TEMPERATURE: 97.6 F

## 2019-02-08 DIAGNOSIS — Z12.5 SCREENING FOR PROSTATE CANCER: ICD-10-CM

## 2019-02-08 DIAGNOSIS — Z98.84 HX OF BARIATRIC SURGERY: ICD-10-CM

## 2019-02-08 DIAGNOSIS — Z13.220 SCREENING FOR CHOLESTEROL LEVEL: ICD-10-CM

## 2019-02-08 DIAGNOSIS — Z00.00 ENCOUNTER FOR HEALTH MAINTENANCE EXAMINATION IN ADULT: ICD-10-CM

## 2019-02-08 DIAGNOSIS — I10 ESSENTIAL HYPERTENSION: ICD-10-CM

## 2019-02-08 DIAGNOSIS — Z13.29 SCREENING FOR THYROID DISORDER: ICD-10-CM

## 2019-02-08 DIAGNOSIS — Z13.0 SCREENING FOR DEFICIENCY ANEMIA: ICD-10-CM

## 2019-02-08 PROCEDURE — 99396 PREV VISIT EST AGE 40-64: CPT | Performed by: FAMILY MEDICINE

## 2019-02-08 RX ORDER — SULFAMETHOXAZOLE AND TRIMETHOPRIM 800; 160 MG/1; MG/1
1 TABLET ORAL 2 TIMES DAILY
Refills: 0 | COMMUNITY
Start: 2019-02-01 | End: 2019-03-19 | Stop reason: ALTCHOICE

## 2019-02-08 NOTE — PROGRESS NOTES
Assessment/Plan:  Health maintenance examination  Laboratory evaluation is noted  Mediterranean diet  Regular exercise  Seatbelts  Smoke detectors  CO detectors  Call with any questions or concerns  Follow-up 1 year  Subjective:      Patient ID: Jennie Patterson is a 60 y o  male  This is a 59-year-old gentleman who presents for a health maintenance examination  Review of Systems   Constitutional: Negative  HENT: Negative  Eyes: Negative  Respiratory: Negative  Cardiovascular: Negative  Gastrointestinal: Negative  Musculoskeletal: Negative  Neurological: Negative  Objective:      /80 (BP Location: Right arm, Patient Position: Sitting, Cuff Size: Large)   Pulse 82   Temp 97 6 °F (36 4 °C) (Temporal)   Resp 12   Ht 6' 0 5" (1 842 m)   Wt 101 kg (222 lb 9 6 oz)   SpO2 97%   BMI 29 78 kg/m²          Physical Exam   Constitutional: He is oriented to person, place, and time  He appears well-developed and well-nourished  HENT:   Head: Normocephalic and atraumatic  Right Ear: External ear normal    Left Ear: External ear normal    Nose: Nose normal    Mouth/Throat: Oropharynx is clear and moist  No oropharyngeal exudate  Eyes: Pupils are equal, round, and reactive to light  Conjunctivae and EOM are normal  Right eye exhibits no discharge  Left eye exhibits no discharge  No scleral icterus  Neck: Normal range of motion  Neck supple  No JVD present  No tracheal deviation present  No thyromegaly present  Cardiovascular: Normal rate, regular rhythm and normal heart sounds  Exam reveals no gallop and no friction rub  No murmur heard  Pulmonary/Chest: Effort normal and breath sounds normal  No stridor  No respiratory distress  He has no wheezes  He has no rales  He exhibits no tenderness  Abdominal: Soft  Bowel sounds are normal  He exhibits no distension and no mass  There is no tenderness  There is no rebound and no guarding  Musculoskeletal: Normal range of motion  He exhibits no edema, tenderness or deformity  Lymphadenopathy:     He has no cervical adenopathy  Neurological: He is alert and oriented to person, place, and time  No cranial nerve deficit   Coordination normal

## 2019-02-09 LAB
25(OH)D3+25(OH)D2 SERPL-MCNC: 28.3 NG/ML (ref 30–100)
ALBUMIN SERPL-MCNC: 4.9 G/DL (ref 3.5–5.5)
ALBUMIN/GLOB SERPL: 1.7 {RATIO} (ref 1.2–2.2)
ALP SERPL-CCNC: 65 IU/L (ref 39–117)
ALT SERPL-CCNC: 19 IU/L (ref 0–44)
AST SERPL-CCNC: 19 IU/L (ref 0–40)
BASOPHILS # BLD AUTO: 0.1 X10E3/UL (ref 0–0.2)
BASOPHILS NFR BLD AUTO: 1 %
BILIRUB SERPL-MCNC: 1.4 MG/DL (ref 0–1.2)
BUN SERPL-MCNC: 12 MG/DL (ref 6–24)
BUN/CREAT SERPL: 9 (ref 9–20)
CALCIUM SERPL-MCNC: 9.9 MG/DL (ref 8.7–10.2)
CHLORIDE SERPL-SCNC: 96 MMOL/L (ref 96–106)
CO2 SERPL-SCNC: 24 MMOL/L (ref 20–29)
CREAT SERPL-MCNC: 1.32 MG/DL (ref 0.76–1.27)
EOSINOPHIL # BLD AUTO: 0.3 X10E3/UL (ref 0–0.4)
EOSINOPHIL NFR BLD AUTO: 4 %
ERYTHROCYTE [DISTWIDTH] IN BLOOD BY AUTOMATED COUNT: 13.4 % (ref 12.3–15.4)
GLOBULIN SER-MCNC: 2.9 G/DL (ref 1.5–4.5)
GLUCOSE SERPL-MCNC: 99 MG/DL (ref 65–99)
HCT VFR BLD AUTO: 49.4 % (ref 37.5–51)
HGB BLD-MCNC: 17.1 G/DL (ref 13–17.7)
IMM GRANULOCYTES # BLD: 0 X10E3/UL (ref 0–0.1)
IMM GRANULOCYTES NFR BLD: 0 %
IRON SERPL-MCNC: 199 UG/DL (ref 38–169)
LYMPHOCYTES # BLD AUTO: 1.8 X10E3/UL (ref 0.7–3.1)
LYMPHOCYTES NFR BLD AUTO: 31 %
MAGNESIUM SERPL-MCNC: 2.1 MG/DL (ref 1.6–2.3)
MCH RBC QN AUTO: 31.3 PG (ref 26.6–33)
MCHC RBC AUTO-ENTMCNC: 34.6 G/DL (ref 31.5–35.7)
MCV RBC AUTO: 91 FL (ref 79–97)
MONOCYTES # BLD AUTO: 0.7 X10E3/UL (ref 0.1–0.9)
MONOCYTES NFR BLD AUTO: 11 %
NEUTROPHILS # BLD AUTO: 3.1 X10E3/UL (ref 1.4–7)
NEUTROPHILS NFR BLD AUTO: 53 %
PLATELET # BLD AUTO: 240 X10E3/UL (ref 150–379)
POTASSIUM SERPL-SCNC: 4.9 MMOL/L (ref 3.5–5.2)
PROT SERPL-MCNC: 7.8 G/DL (ref 6–8.5)
PSA FREE MFR SERPL: 34.8 %
PSA FREE SERPL-MCNC: 0.87 NG/ML
PSA SERPL-MCNC: 2.5 NG/ML (ref 0–4)
RBC # BLD AUTO: 5.46 X10E6/UL (ref 4.14–5.8)
SL AMB EGFR AFRICAN AMERICAN: 68 ML/MIN/1.73
SL AMB EGFR NON AFRICAN AMERICAN: 59 ML/MIN/1.73
SODIUM SERPL-SCNC: 137 MMOL/L (ref 134–144)
TSH SERPL DL<=0.005 MIU/L-ACNC: 0.62 UIU/ML (ref 0.45–4.5)
VIT B12 SERPL-MCNC: 388 PG/ML (ref 232–1245)
WBC # BLD AUTO: 5.9 X10E3/UL (ref 3.4–10.8)

## 2019-02-11 ENCOUNTER — TELEPHONE (OUTPATIENT)
Dept: FAMILY MEDICINE CLINIC | Facility: CLINIC | Age: 59
End: 2019-02-11

## 2019-02-12 LAB
CHOLEST SERPL-MCNC: 218 MG/DL (ref 100–199)
HDLC SERPL-MCNC: 40 MG/DL
LABCORP COMMENT: NORMAL
LDLC SERPL CALC-MCNC: 148 MG/DL (ref 0–99)
SL AMB VLDL CHOLESTEROL CALC: 30 MG/DL (ref 5–40)
TRIGL SERPL-MCNC: 149 MG/DL (ref 0–149)

## 2019-02-18 ENCOUNTER — OFFICE VISIT (OUTPATIENT)
Dept: FAMILY MEDICINE CLINIC | Facility: CLINIC | Age: 59
End: 2019-02-18
Payer: COMMERCIAL

## 2019-02-18 VITALS
BODY MASS INDEX: 29.69 KG/M2 | OXYGEN SATURATION: 97 % | WEIGHT: 224 LBS | SYSTOLIC BLOOD PRESSURE: 140 MMHG | HEIGHT: 73 IN | RESPIRATION RATE: 16 BRPM | TEMPERATURE: 97.6 F | HEART RATE: 84 BPM | DIASTOLIC BLOOD PRESSURE: 70 MMHG

## 2019-02-18 DIAGNOSIS — N13.8 BPH WITH OBSTRUCTION/LOWER URINARY TRACT SYMPTOMS: ICD-10-CM

## 2019-02-18 DIAGNOSIS — K40.20 NON-RECURRENT BILATERAL INGUINAL HERNIA WITHOUT OBSTRUCTION OR GANGRENE: Primary | ICD-10-CM

## 2019-02-18 DIAGNOSIS — N40.1 BPH WITH OBSTRUCTION/LOWER URINARY TRACT SYMPTOMS: ICD-10-CM

## 2019-02-18 PROCEDURE — 99213 OFFICE O/P EST LOW 20 MIN: CPT | Performed by: FAMILY MEDICINE

## 2019-02-18 PROCEDURE — 3008F BODY MASS INDEX DOCD: CPT | Performed by: FAMILY MEDICINE

## 2019-02-18 PROCEDURE — 1036F TOBACCO NON-USER: CPT | Performed by: FAMILY MEDICINE

## 2019-02-18 RX ORDER — TAMSULOSIN HCL 0.4 MG
CAPSULE ORAL
COMMUNITY
Start: 2019-02-15 | End: 2021-12-15 | Stop reason: HOSPADM

## 2019-02-18 RX ORDER — AMITRIPTYLINE HYDROCHLORIDE 10 MG/1
TABLET, FILM COATED ORAL
COMMUNITY
Start: 2019-02-15

## 2019-02-18 NOTE — PROGRESS NOTES
Assessment/Plan:   Left inguinal pain  As the patient just started Flomax will monitor for a month  If there is no change in the inguinal pain recommended a surgical consultation  Call with any questions or concerns  Follow-up 4 weeks  Call immediately for change in symptomatology  Subjective:     Patient ID: Pierre Escalera is a 60 y o  male  This is a 50-year-old gentleman who recently had a CT scan which demonstrated an enlarged for prostate, tiny bilateral inguinal hernias containing pro peritoneal fat, left lower pole renal calculi  Review of Systems   Constitutional: Negative  Respiratory: Negative  Cardiovascular: Negative  Gastrointestinal: Abdominal pain: Left inguinal pain  Objective:     Physical Exam   Constitutional: He is oriented to person, place, and time  He appears well-developed and well-nourished  HENT:   Head: Normocephalic and atraumatic  Pulmonary/Chest: Effort normal    Neurological: He is alert and oriented to person, place, and time  No cranial nerve deficit   Coordination normal

## 2019-03-05 LAB — LABCORP COMMENT: NORMAL

## 2019-03-19 ENCOUNTER — OFFICE VISIT (OUTPATIENT)
Dept: FAMILY MEDICINE CLINIC | Facility: CLINIC | Age: 59
End: 2019-03-19
Payer: COMMERCIAL

## 2019-03-19 VITALS
RESPIRATION RATE: 16 BRPM | TEMPERATURE: 97.2 F | OXYGEN SATURATION: 98 % | DIASTOLIC BLOOD PRESSURE: 80 MMHG | BODY MASS INDEX: 30.48 KG/M2 | WEIGHT: 225 LBS | HEIGHT: 72 IN | SYSTOLIC BLOOD PRESSURE: 160 MMHG | HEART RATE: 72 BPM

## 2019-03-19 DIAGNOSIS — R10.9 LEFT FLANK PAIN: ICD-10-CM

## 2019-03-19 DIAGNOSIS — E83.19 INCREASED STORAGE IRON: ICD-10-CM

## 2019-03-19 DIAGNOSIS — Z12.5 SCREENING FOR PROSTATE CANCER: ICD-10-CM

## 2019-03-19 PROCEDURE — 3008F BODY MASS INDEX DOCD: CPT | Performed by: FAMILY MEDICINE

## 2019-03-19 PROCEDURE — 1036F TOBACCO NON-USER: CPT | Performed by: FAMILY MEDICINE

## 2019-03-19 PROCEDURE — 99213 OFFICE O/P EST LOW 20 MIN: CPT | Performed by: FAMILY MEDICINE

## 2019-03-19 NOTE — PROGRESS NOTES
Assessment/Plan:   Left flank pain  BPH  Follow-up PSA today  Pain most likely from kidney stone in lower pole left kidney  Patient's previous laboratory evaluation demonstrated elevated iron  Further evaluation of high iron level today  Call with any questions or concerns  Follow-up 1 month  Diagnoses and all orders for this visit:    Screening for prostate cancer  -     Cancel: PSA, Serum (Serial Monitor)  -     Cancel: PSA Total (Reflex To Free); Future  -     PSA Total (Reflex To Free)    Increased storage iron  -     Iron  -     Ferritin  -     Transferrin          Subjective:     Patient ID: Serafin Mckeonpool is a 60 y o  male  This is a 71-year-old gentleman is experiencing pain in the left flank which radiates to the inguinal region  The patient does have a kidney stone lower pole left kidney  Review of Systems   Constitutional: Negative  Respiratory: Negative  Cardiovascular: Negative  Genitourinary: Positive for flank pain  Objective:     Physical Exam   Constitutional: He appears well-developed and well-nourished  HENT:   Head: Normocephalic and atraumatic  Genitourinary:   Genitourinary Comments: Enlarged prostate  Left flank pain

## 2019-03-20 LAB
FERRITIN SERPL-MCNC: 342 NG/ML (ref 30–400)
IRON SERPL-MCNC: 124 UG/DL (ref 38–169)
PSA SERPL-MCNC: 2.1 NG/ML (ref 0–4)
SL AMB REFLEX CRITERIA: NORMAL
TRANSFERRIN SERPL-MCNC: 278 MG/DL (ref 200–370)

## 2019-05-23 DIAGNOSIS — I10 ESSENTIAL HYPERTENSION: ICD-10-CM

## 2019-05-23 RX ORDER — CANDESARTAN CILEXETIL AND HYDROCHLOROTHIAZIDE 32; 12.5 MG/1; MG/1
TABLET ORAL
Qty: 90 TABLET | Refills: 3 | Status: SHIPPED | OUTPATIENT
Start: 2019-05-23 | End: 2020-05-11 | Stop reason: SDUPTHER

## 2020-03-11 ENCOUNTER — TELEPHONE (OUTPATIENT)
Dept: FAMILY MEDICINE CLINIC | Facility: CLINIC | Age: 60
End: 2020-03-11

## 2020-03-11 NOTE — TELEPHONE ENCOUNTER
Relayed Dr Monteiro's message to Raegan Nicholson  No further action is required at this time  Raegan Nicholson states he will keep us posted

## 2020-03-11 NOTE — TELEPHONE ENCOUNTER
Mesha Pop traveled to [place] by [employer] on 02/29-3/8  On March 8 night, Mesha Pop developed a fever which is ranging from   2  He is taking tylenol exey 6 hrs  Mesha Pop also has a cough, sore throat , and achey  The only people Mesha Pop has been in contact with since getting home on March 8 is his wife and son  His wife was also on the cruise too  Please advise your thoughts    [phone]    Cc: Dr Ashley Jerry and Rock Linares

## 2020-03-11 NOTE — TELEPHONE ENCOUNTER
IF THERE HAVE BEEN NO CASES REPORTED ON THEIR CRUISE THEY WOULD BE CONSIDERED LOW RISK  IF SYMPTOMS CONTINUE HE SHOULD BE SEEN IN THE OFFICE  IF THERE HAD BEEN CASES REPORTED ON THE SHIP, THAT IS A DIFFERENT STORY AND WOULD TRIGGER THE PROTOCOL

## 2020-05-11 DIAGNOSIS — I10 ESSENTIAL HYPERTENSION: ICD-10-CM

## 2020-05-11 RX ORDER — CANDESARTAN CILEXETIL AND HYDROCHLOROTHIAZIDE 32; 12.5 MG/1; MG/1
1 TABLET ORAL DAILY
Qty: 90 TABLET | Refills: 3 | Status: SHIPPED | OUTPATIENT
Start: 2020-05-11

## 2020-06-04 ENCOUNTER — TELEPHONE (OUTPATIENT)
Dept: FAMILY MEDICINE CLINIC | Facility: CLINIC | Age: 60
End: 2020-06-04

## 2020-06-04 DIAGNOSIS — Z13.220 SCREENING FOR LIPID DISORDERS: ICD-10-CM

## 2020-06-04 DIAGNOSIS — Z11.4 SCREENING FOR HIV (HUMAN IMMUNODEFICIENCY VIRUS): ICD-10-CM

## 2020-06-04 DIAGNOSIS — Z13.0 SCREENING FOR DEFICIENCY ANEMIA: ICD-10-CM

## 2020-06-04 DIAGNOSIS — Z91.89 ENCOUNTER FOR HEPATITIS C VIRUS SCREENING TEST FOR HIGH RISK PATIENT: ICD-10-CM

## 2020-06-04 DIAGNOSIS — Z13.1 SCREENING FOR DIABETES MELLITUS: ICD-10-CM

## 2020-06-04 DIAGNOSIS — N13.8 BPH WITH OBSTRUCTION/LOWER URINARY TRACT SYMPTOMS: ICD-10-CM

## 2020-06-04 DIAGNOSIS — Z13.29 SCREENING FOR THYROID DISORDER: ICD-10-CM

## 2020-06-04 DIAGNOSIS — I10 ESSENTIAL HYPERTENSION: ICD-10-CM

## 2020-06-04 DIAGNOSIS — Z12.5 SCREENING FOR PROSTATE CANCER: ICD-10-CM

## 2020-06-04 DIAGNOSIS — Z00.00 PREVENTATIVE HEALTH CARE: Primary | ICD-10-CM

## 2020-06-04 DIAGNOSIS — Z11.59 ENCOUNTER FOR HEPATITIS C VIRUS SCREENING TEST FOR HIGH RISK PATIENT: ICD-10-CM

## 2020-06-04 DIAGNOSIS — Z98.84 HX OF BARIATRIC SURGERY: ICD-10-CM

## 2020-06-04 DIAGNOSIS — N40.1 BPH WITH OBSTRUCTION/LOWER URINARY TRACT SYMPTOMS: ICD-10-CM

## 2020-06-11 LAB
ALBUMIN SERPL-MCNC: 5 G/DL (ref 3.8–4.9)
ALBUMIN/GLOB SERPL: 1.8 {RATIO} (ref 1.2–2.2)
ALP SERPL-CCNC: 64 IU/L (ref 39–117)
ALT SERPL-CCNC: 19 IU/L (ref 0–44)
AST SERPL-CCNC: 20 IU/L (ref 0–40)
BASOPHILS # BLD AUTO: 0.1 X10E3/UL (ref 0–0.2)
BASOPHILS NFR BLD AUTO: 2 %
BILIRUB SERPL-MCNC: 2 MG/DL (ref 0–1.2)
BUN SERPL-MCNC: 14 MG/DL (ref 8–27)
BUN/CREAT SERPL: 14 (ref 10–24)
CALCIUM SERPL-MCNC: 9.7 MG/DL (ref 8.6–10.2)
CHLORIDE SERPL-SCNC: 98 MMOL/L (ref 96–106)
CHOLEST SERPL-MCNC: 260 MG/DL (ref 100–199)
CHOLEST/HDLC SERPL: 5.7 RATIO (ref 0–5)
CO2 SERPL-SCNC: 26 MMOL/L (ref 20–29)
CREAT SERPL-MCNC: 1.01 MG/DL (ref 0.76–1.27)
EOSINOPHIL # BLD AUTO: 0.2 X10E3/UL (ref 0–0.4)
EOSINOPHIL NFR BLD AUTO: 5 %
ERYTHROCYTE [DISTWIDTH] IN BLOOD BY AUTOMATED COUNT: 13.5 % (ref 11.6–15.4)
GLOBULIN SER-MCNC: 2.8 G/DL (ref 1.5–4.5)
GLUCOSE SERPL-MCNC: 113 MG/DL (ref 65–99)
HCT VFR BLD AUTO: 51.6 % (ref 37.5–51)
HCV AB S/CO SERPL IA: <0.1 S/CO RATIO (ref 0–0.9)
HDLC SERPL-MCNC: 46 MG/DL
HGB BLD-MCNC: 17.5 G/DL (ref 13–17.7)
HIV 1+2 AB+HIV1 P24 AG SERPL QL IA: NON REACTIVE
IMM GRANULOCYTES # BLD: 0 X10E3/UL (ref 0–0.1)
IMM GRANULOCYTES NFR BLD: 0 %
LDLC SERPL CALC-MCNC: 171 MG/DL (ref 0–99)
LYMPHOCYTES # BLD AUTO: 1.7 X10E3/UL (ref 0.7–3.1)
LYMPHOCYTES NFR BLD AUTO: 37 %
MCH RBC QN AUTO: 30.6 PG (ref 26.6–33)
MCHC RBC AUTO-ENTMCNC: 33.9 G/DL (ref 31.5–35.7)
MCV RBC AUTO: 90 FL (ref 79–97)
MONOCYTES # BLD AUTO: 0.5 X10E3/UL (ref 0.1–0.9)
MONOCYTES NFR BLD AUTO: 11 %
NEUTROPHILS # BLD AUTO: 2 X10E3/UL (ref 1.4–7)
NEUTROPHILS NFR BLD AUTO: 45 %
PLATELET # BLD AUTO: 210 X10E3/UL (ref 150–450)
POTASSIUM SERPL-SCNC: 4.7 MMOL/L (ref 3.5–5.2)
PROT SERPL-MCNC: 7.8 G/DL (ref 6–8.5)
PSA SERPL-MCNC: 1.7 NG/ML (ref 0–4)
RBC # BLD AUTO: 5.72 X10E6/UL (ref 4.14–5.8)
SL AMB EGFR AFRICAN AMERICAN: 93 ML/MIN/1.73
SL AMB EGFR NON AFRICAN AMERICAN: 80 ML/MIN/1.73
SL AMB REFLEX CRITERIA: NORMAL
SL AMB VLDL CHOLESTEROL CALC: 43 MG/DL (ref 5–40)
SODIUM SERPL-SCNC: 140 MMOL/L (ref 134–144)
TRIGL SERPL-MCNC: 216 MG/DL (ref 0–149)
TSH SERPL DL<=0.005 MIU/L-ACNC: 1.09 UIU/ML (ref 0.45–4.5)
WBC # BLD AUTO: 4.5 X10E3/UL (ref 3.4–10.8)

## 2020-06-15 ENCOUNTER — OFFICE VISIT (OUTPATIENT)
Dept: FAMILY MEDICINE CLINIC | Facility: CLINIC | Age: 60
End: 2020-06-15
Payer: COMMERCIAL

## 2020-06-15 VITALS
HEIGHT: 73 IN | DIASTOLIC BLOOD PRESSURE: 82 MMHG | RESPIRATION RATE: 16 BRPM | HEART RATE: 72 BPM | BODY MASS INDEX: 30.35 KG/M2 | OXYGEN SATURATION: 98 % | SYSTOLIC BLOOD PRESSURE: 138 MMHG | TEMPERATURE: 97.3 F | WEIGHT: 229 LBS

## 2020-06-15 DIAGNOSIS — Z00.00 ENCOUNTER FOR ANNUAL GENERAL MEDICAL EXAMINATION WITHOUT ABNORMAL FINDINGS IN ADULT: Primary | ICD-10-CM

## 2020-06-15 DIAGNOSIS — R73.09 ELEVATED GLUCOSE: ICD-10-CM

## 2020-06-15 DIAGNOSIS — E78.49 FAMILIAL HYPERLIPIDEMIA: ICD-10-CM

## 2020-06-15 DIAGNOSIS — M79.2 NEURALGIA: ICD-10-CM

## 2020-06-15 DIAGNOSIS — D22.9 ATYPICAL NEVI: ICD-10-CM

## 2020-06-15 DIAGNOSIS — Z80.8 FAMILY HISTORY OF SKIN CANCER: ICD-10-CM

## 2020-06-15 DIAGNOSIS — L30.9 ECZEMA, UNSPECIFIED TYPE: ICD-10-CM

## 2020-06-15 DIAGNOSIS — E66.09 CLASS 1 OBESITY DUE TO EXCESS CALORIES WITH SERIOUS COMORBIDITY AND BODY MASS INDEX (BMI) OF 30.0 TO 30.9 IN ADULT: ICD-10-CM

## 2020-06-15 PROBLEM — N40.0 ENLARGED PROSTATE: Status: ACTIVE | Noted: 2019-02-15

## 2020-06-15 PROBLEM — R68.89 COLD INTOLERANCE: Status: ACTIVE | Noted: 2018-01-23

## 2020-06-15 PROBLEM — E66.811 CLASS 1 OBESITY DUE TO EXCESS CALORIES WITH SERIOUS COMORBIDITY AND BODY MASS INDEX (BMI) OF 30.0 TO 30.9 IN ADULT: Status: ACTIVE | Noted: 2020-06-15

## 2020-06-15 LAB
SL AMB  POCT GLUCOSE, UA: 0
SL AMB LEUKOCYTE ESTERASE,UA: 0
SL AMB POCT BILIRUBIN,UA: 0
SL AMB POCT BLOOD,UA: ABNORMAL
SL AMB POCT CLARITY,UA: CLEAR
SL AMB POCT COLOR,UA: YELLOW
SL AMB POCT HEMOGLOBIN AIC: 5.8 (ref ?–6.5)
SL AMB POCT KETONES,UA: 0
SL AMB POCT NITRITE,UA: 0
SL AMB POCT PH,UA: 5
SL AMB POCT SPECIFIC GRAVITY,UA: 1.02
SL AMB POCT URINE PROTEIN: 0
SL AMB POCT UROBILINOGEN: 1

## 2020-06-15 PROCEDURE — 81003 URINALYSIS AUTO W/O SCOPE: CPT | Performed by: NURSE PRACTITIONER

## 2020-06-15 PROCEDURE — 3008F BODY MASS INDEX DOCD: CPT | Performed by: NURSE PRACTITIONER

## 2020-06-15 PROCEDURE — 83036 HEMOGLOBIN GLYCOSYLATED A1C: CPT | Performed by: NURSE PRACTITIONER

## 2020-06-15 PROCEDURE — 3075F SYST BP GE 130 - 139MM HG: CPT | Performed by: NURSE PRACTITIONER

## 2020-06-15 PROCEDURE — 3079F DIAST BP 80-89 MM HG: CPT | Performed by: NURSE PRACTITIONER

## 2020-06-15 PROCEDURE — 99396 PREV VISIT EST AGE 40-64: CPT | Performed by: NURSE PRACTITIONER

## 2020-06-15 RX ORDER — ATORVASTATIN CALCIUM 10 MG/1
10 TABLET, FILM COATED ORAL DAILY
Qty: 90 TABLET | Refills: 3 | Status: SHIPPED | OUTPATIENT
Start: 2020-06-15 | End: 2021-03-26

## 2020-12-10 LAB
CHOLEST SERPL-MCNC: 202 MG/DL (ref 100–199)
CHOLEST/HDLC SERPL: 4.9 RATIO (ref 0–5)
HDLC SERPL-MCNC: 41 MG/DL
LDLC SERPL CALC-MCNC: 138 MG/DL (ref 0–99)
SL AMB VLDL CHOLESTEROL CALC: 23 MG/DL (ref 5–40)
TRIGL SERPL-MCNC: 129 MG/DL (ref 0–149)

## 2020-12-14 ENCOUNTER — OFFICE VISIT (OUTPATIENT)
Dept: FAMILY MEDICINE CLINIC | Facility: CLINIC | Age: 60
End: 2020-12-14
Payer: COMMERCIAL

## 2020-12-14 VITALS
SYSTOLIC BLOOD PRESSURE: 140 MMHG | BODY MASS INDEX: 28.65 KG/M2 | DIASTOLIC BLOOD PRESSURE: 86 MMHG | TEMPERATURE: 96.3 F | WEIGHT: 216.2 LBS | RESPIRATION RATE: 14 BRPM | HEART RATE: 87 BPM | OXYGEN SATURATION: 97 % | HEIGHT: 73 IN

## 2020-12-14 DIAGNOSIS — Z23 NEED FOR SHINGLES VACCINE: ICD-10-CM

## 2020-12-14 DIAGNOSIS — I10 ESSENTIAL HYPERTENSION: ICD-10-CM

## 2020-12-14 DIAGNOSIS — E78.49 FAMILIAL HYPERLIPIDEMIA: Primary | ICD-10-CM

## 2020-12-14 DIAGNOSIS — N40.0 ENLARGED PROSTATE: ICD-10-CM

## 2020-12-14 PROCEDURE — 1036F TOBACCO NON-USER: CPT | Performed by: NURSE PRACTITIONER

## 2020-12-14 PROCEDURE — 3008F BODY MASS INDEX DOCD: CPT | Performed by: NURSE PRACTITIONER

## 2020-12-14 PROCEDURE — 90471 IMMUNIZATION ADMIN: CPT

## 2020-12-14 PROCEDURE — 90750 HZV VACC RECOMBINANT IM: CPT

## 2020-12-14 PROCEDURE — 3077F SYST BP >= 140 MM HG: CPT | Performed by: NURSE PRACTITIONER

## 2020-12-14 PROCEDURE — 3079F DIAST BP 80-89 MM HG: CPT | Performed by: NURSE PRACTITIONER

## 2020-12-14 PROCEDURE — 99213 OFFICE O/P EST LOW 20 MIN: CPT | Performed by: NURSE PRACTITIONER

## 2021-02-03 ENCOUNTER — OFFICE VISIT (OUTPATIENT)
Dept: FAMILY MEDICINE CLINIC | Facility: CLINIC | Age: 61
End: 2021-02-03
Payer: COMMERCIAL

## 2021-02-03 VITALS
SYSTOLIC BLOOD PRESSURE: 130 MMHG | BODY MASS INDEX: 29.29 KG/M2 | HEIGHT: 73 IN | RESPIRATION RATE: 16 BRPM | WEIGHT: 221 LBS | HEART RATE: 86 BPM | DIASTOLIC BLOOD PRESSURE: 96 MMHG | TEMPERATURE: 96.9 F

## 2021-02-03 DIAGNOSIS — H81.13 BPV (BENIGN POSITIONAL VERTIGO), BILATERAL: Primary | ICD-10-CM

## 2021-02-03 DIAGNOSIS — R42 DIZZINESS: ICD-10-CM

## 2021-02-03 DIAGNOSIS — I10 ESSENTIAL HYPERTENSION: ICD-10-CM

## 2021-02-03 PROCEDURE — 36415 COLL VENOUS BLD VENIPUNCTURE: CPT | Performed by: NURSE PRACTITIONER

## 2021-02-03 PROCEDURE — 3080F DIAST BP >= 90 MM HG: CPT | Performed by: NURSE PRACTITIONER

## 2021-02-03 PROCEDURE — 99214 OFFICE O/P EST MOD 30 MIN: CPT | Performed by: NURSE PRACTITIONER

## 2021-02-03 PROCEDURE — 1036F TOBACCO NON-USER: CPT | Performed by: NURSE PRACTITIONER

## 2021-02-03 PROCEDURE — 3075F SYST BP GE 130 - 139MM HG: CPT | Performed by: NURSE PRACTITIONER

## 2021-02-03 PROCEDURE — 3725F SCREEN DEPRESSION PERFORMED: CPT | Performed by: NURSE PRACTITIONER

## 2021-02-03 PROCEDURE — 3008F BODY MASS INDEX DOCD: CPT | Performed by: NURSE PRACTITIONER

## 2021-02-03 RX ORDER — MECLIZINE HCL 12.5 MG/1
12.5 TABLET ORAL 3 TIMES DAILY PRN
Qty: 30 TABLET | Refills: 0 | Status: SHIPPED | OUTPATIENT
Start: 2021-02-03 | End: 2021-09-15

## 2021-02-03 NOTE — PATIENT INSTRUCTIONS
Vestibular Crystals      Benign Paroxysmal Positional Vertigo   WHAT YOU NEED TO KNOW:   BPPV is an inner ear condition that causes you to suddenly feel dizzy  Benign means it is not serious or life-threatening  BPPV is caused by a problem with the nerves and structure of your inner ear  BPPV happens when small pieces of calcium break loose and lump together in one of your inner ear canals  DISCHARGE INSTRUCTIONS:   Return to the emergency department if:   · You fall during a BPPV episode and are injured  · You have a severe headache that does not go away  · You have new changes in your vision or feel weak or confused  · You have problems hearing, or you have ringing or buzzing in your ears  Contact your healthcare provider if:   · Your BPPV symptoms do not go away or they return  · You have problems with your balance, or you are falling often  · You have new or increased nausea or vomiting with vertigo  · You feel anxious or depressed and do not want to leave your home  · You have questions or concerns about your condition or care  Medicines:   · Medicines  may be recommended or prescribed to treat dizziness or nausea  · Take your medicine as directed  Contact your healthcare provider if you think your medicine is not helping or if you have side effects  Tell him of her if you are allergic to any medicine  Keep a list of the medicines, vitamins, and herbs you take  Include the amounts, and when and why you take them  Bring the list or the pill bottles to follow-up visits  Carry your medicine list with you in case of an emergency  Prevent your symptoms:   · Try to avoid sudden head movements  Stand up and lie down slowly  · Raise and support your head when you lie down  Place pillows under your upper back and head or rest in a recliner  · Change your position often when you are lying down  Try not to lie with your head on the same side for long periods of time   Roll over slowly  · Wear protective gear  when you ride a bike or play sports  A helmet helps protect your head from injury  Follow up with your healthcare provider as directed: You may need to return in 1 month to check the progress of your treatment  Write down your questions so you remember to ask them during your visits  © Copyright [address] Information is for End User's use only and may not be sold, redistributed or otherwise used for commercial purposes  All illustrations and images included in CareNotes® are the copyrighted property of [employer]  or [facility] Kartik Wheatley   The above information is an  only  It is not intended as medical advice for individual conditions or treatments  Talk to your doctor, nurse or pharmacist before following any medical regimen to see if it is safe and effective for you

## 2021-02-03 NOTE — PROGRESS NOTES
Assessment/Plan:    1  BPV (benign positional vertigo), bilateral  -     Ambulatory referral to Physical Therapy; Future  -     meclizine (ANTIVERT) 12 5 MG tablet; Take 1 tablet (12 5 mg total) by mouth 3 (three) times a day as needed for dizziness  -     CBC and differential  -     Basic metabolic panel  -     Sedimentation rate, automated    2  Dizziness  -     CBC and differential  -     Basic metabolic panel  -     Sedimentation rate, automated    3  Essential hypertension  Assessment & Plan:  Mild elevation in office today  Advise increase in hydration  Monitor for improvement in dizziness  Recommend pt take his BP at home periodically, record readings and return to the office to review results  Sodium reduction  Blood draw performed in office today  Return in about 4 weeks (around 3/3/2021) for Recheck  Subjective:      Patient ID: Teodoro Jarrett is a 63 y o  male  Chief Complaint   Patient presents with    Dizziness     Pt c/o dizziness that began on Monday when waking up        Sameer is a 63year old male who presents to the office for evaluation of dizziness  Pt reports symptoms began about 3 days ago  Reports he awoke in the morning and sat up from a lying position and "felt like the room was spinning " Pt reports the spinning went in one direction and then changed directions after a few moments  States his symptoms were transient and lasted a few seconds  Reports he has noticed the dizziness whenever he extends his neck backwards, when he moves from lying to sitting position and when he is lying and turning his head to either side  Denies nausea or vomiting  Denies vision changes or any recent injury  Pt reports he was shoveling snow but his symptoms started prior to that         The following portions of the patient's history were reviewed and updated as appropriate: allergies, current medications, past family history, past medical history, past social history, past surgical history and problem list     Review of Systems   Constitutional: Negative for chills, diaphoresis, fatigue and fever  HENT: Positive for hearing loss ("tinnitus", chronic per pt)  Negative for congestion, ear discharge, ear pain, postnasal drip, rhinorrhea, sinus pressure, sinus pain and sore throat  Eyes: Negative for pain and discharge  Respiratory: Negative for cough, chest tightness, shortness of breath and wheezing  Cardiovascular: Negative for chest pain  Gastrointestinal: Negative for diarrhea, nausea and vomiting  Genitourinary: Negative for dysuria  Musculoskeletal: Negative for myalgias  Skin: Negative for rash  Neurological: Positive for dizziness  Negative for headaches  Hematological: Negative for adenopathy  Current Outpatient Medications   Medication Sig Dispense Refill    amitriptyline (ELAVIL) 10 mg tablet       atorvastatin (LIPITOR) 10 mg tablet Take 1 tablet (10 mg total) by mouth daily 90 tablet 3    candesartan-hydrochlorothiazide (ATACAND HCT) 32-12 5 MG per tablet Take 1 tablet by mouth daily 90 tablet 3    tamsulosin (FLOMAX) 0 4 mg       meclizine (ANTIVERT) 12 5 MG tablet Take 1 tablet (12 5 mg total) by mouth 3 (three) times a day as needed for dizziness 30 tablet 0     No current facility-administered medications for this visit  Objective:    /96 (BP Location: Left arm, Patient Position: Sitting, Cuff Size: Adult)   Pulse 86   Temp (!) 96 9 °F (36 1 °C) (Temporal)   Resp 16   Ht 6' 1" (1 854 m)   Wt 100 kg (221 lb)   BMI 29 16 kg/m²        Physical Exam  Vitals signs reviewed  Constitutional:       General: He is not in acute distress  Appearance: He is well-developed  He is not diaphoretic  HENT:      Head: Normocephalic and atraumatic  Right Ear: Ear canal and external ear normal  There is impacted cerumen  Left Ear: Ear canal and external ear normal  There is impacted cerumen     Eyes:      General: Lids are normal          Right eye: No discharge  Left eye: No discharge  Extraocular Movements: Extraocular movements intact  Right eye: No nystagmus  Left eye: No nystagmus  Conjunctiva/sclera: Conjunctivae normal       Pupils: Pupils are equal, round, and reactive to light  Pupils are equal    Neck:      Musculoskeletal: Full passive range of motion without pain, normal range of motion and neck supple  Thyroid: No thyroid mass or thyromegaly  Cardiovascular:      Rate and Rhythm: Normal rate and regular rhythm  Pulses: Normal pulses  Heart sounds: Normal heart sounds, S1 normal and S2 normal    Pulmonary:      Effort: Pulmonary effort is normal       Breath sounds: Normal breath sounds  No decreased breath sounds, wheezing, rhonchi or rales  Musculoskeletal:      Right lower leg: No edema  Left lower leg: No edema  Lymphadenopathy:      Cervical: No cervical adenopathy  Right cervical: No superficial cervical adenopathy  Left cervical: No superficial cervical adenopathy  Skin:     General: Skin is warm and dry  Findings: No rash  Neurological:      General: No focal deficit present  Mental Status: He is alert and oriented to person, place, and time  Cranial Nerves: Cranial nerves are intact  Motor: Motor function is intact  Deep Tendon Reflexes: Reflexes are normal and symmetric  Comments: Modified epley maneuver produced dizziness - right side    Psychiatric:         Mood and Affect: Mood normal          Speech: Speech normal          Behavior: Behavior normal          Thought Content:  Thought content normal          Judgment: Judgment normal            ISABEL Degroot

## 2021-02-04 ENCOUNTER — EVALUATION (OUTPATIENT)
Dept: PHYSICAL THERAPY | Facility: CLINIC | Age: 61
End: 2021-02-04
Payer: COMMERCIAL

## 2021-02-04 DIAGNOSIS — H81.13 BPV (BENIGN POSITIONAL VERTIGO), BILATERAL: ICD-10-CM

## 2021-02-04 LAB
BASOPHILS # BLD AUTO: 0 X10E3/UL (ref 0–0.2)
BASOPHILS NFR BLD AUTO: 1 %
BUN SERPL-MCNC: 13 MG/DL (ref 8–27)
BUN/CREAT SERPL: 13 (ref 10–24)
CALCIUM SERPL-MCNC: 9.8 MG/DL (ref 8.6–10.2)
CHLORIDE SERPL-SCNC: 98 MMOL/L (ref 96–106)
CO2 SERPL-SCNC: 26 MMOL/L (ref 20–29)
CREAT SERPL-MCNC: 1.02 MG/DL (ref 0.76–1.27)
EOSINOPHIL # BLD AUTO: 0.2 X10E3/UL (ref 0–0.4)
EOSINOPHIL NFR BLD AUTO: 4 %
ERYTHROCYTE [DISTWIDTH] IN BLOOD BY AUTOMATED COUNT: 13.6 % (ref 11.6–15.4)
ERYTHROCYTE [SEDIMENTATION RATE] IN BLOOD BY WESTERGREN METHOD: 30 MM/HR (ref 0–30)
GLUCOSE SERPL-MCNC: 119 MG/DL (ref 65–99)
HCT VFR BLD AUTO: 51 % (ref 37.5–51)
HGB BLD-MCNC: 17.6 G/DL (ref 13–17.7)
IMM GRANULOCYTES # BLD: 0 X10E3/UL (ref 0–0.1)
IMM GRANULOCYTES NFR BLD: 1 %
LYMPHOCYTES # BLD AUTO: 2 X10E3/UL (ref 0.7–3.1)
LYMPHOCYTES NFR BLD AUTO: 35 %
MCH RBC QN AUTO: 31.2 PG (ref 26.6–33)
MCHC RBC AUTO-ENTMCNC: 34.5 G/DL (ref 31.5–35.7)
MCV RBC AUTO: 90 FL (ref 79–97)
MONOCYTES # BLD AUTO: 0.6 X10E3/UL (ref 0.1–0.9)
MONOCYTES NFR BLD AUTO: 11 %
NEUTROPHILS # BLD AUTO: 2.8 X10E3/UL (ref 1.4–7)
NEUTROPHILS NFR BLD AUTO: 48 %
PLATELET # BLD AUTO: 215 X10E3/UL (ref 150–450)
POTASSIUM SERPL-SCNC: 4 MMOL/L (ref 3.5–5.2)
RBC # BLD AUTO: 5.64 X10E6/UL (ref 4.14–5.8)
SL AMB EGFR AFRICAN AMERICAN: 91 ML/MIN/1.73
SL AMB EGFR NON AFRICAN AMERICAN: 79 ML/MIN/1.73
SODIUM SERPL-SCNC: 140 MMOL/L (ref 134–144)
WBC # BLD AUTO: 5.7 X10E3/UL (ref 3.4–10.8)

## 2021-02-04 PROCEDURE — 97162 PT EVAL MOD COMPLEX 30 MIN: CPT | Performed by: PHYSICAL THERAPIST

## 2021-02-04 NOTE — PROGRESS NOTES
PT Evaluation     Today's date: 2021  Patient name: Pearl Calabrese  : 1960  MRN: [id number]  Referring provider: ISABEL Herrmann  Dx:   Encounter Diagnosis     ICD-10-CM    1  BPV (benign positional vertigo), bilateral  H81 13 Ambulatory referral to Physical Therapy                  Assessment  Assessment details: Pearl Calabrese is a 63 y o  male who presents with vertigo and dizziness brought on with positional changes  Due to these impairments, patient has difficulty performing bed mobility, transfers and quick head movements  Patient has strong balance reactions that is decreasing falls risk  Patient's clinical presentation is consistent right sided BPPV  Patient has been educated in post Epley maneuver and education in BPPV  Patient would benefit from skilled physical therapy services to address their aforementioned functional limitations and progress towards prior level of function and independence with home exercise program    Impairments: activity intolerance and lacks appropriate home exercise program  Understanding of Dx/Px/POC: good   Prognosis: good    Goals  STG 2-4 Weeks  1  Pt will be able to roll in bed without symptoms of vertigo  2  Pt will be able to transfer supine to sit without provocation of vertigo  3  Pt will be I with Home Epley Maneuver    LTG  Not applicable at this time  Plan  Plan details:     Patient would benefit from: PT eval and skilled physical therapy  Planned therapy interventions: manual therapy, neuromuscular re-education, self care, therapeutic activities, therapeutic exercise, home exercise program, canalith repositioning and patient education  Frequency: 2x week  Duration in weeks: 4  Treatment plan discussed with: patient        Subjective Evaluation    History of Present Illness  Mechanism of injury: Pearl Calabrese reports on 21 when he got out of bed suddenly  And fell to the floor and the room was spinning    He reports it continues to occur every morning and every time he lays down  He reports mild dizziness with turning head  He went to his family doctor who had him lay down over a pillow which was the most intense vertigo he has experienced  He has now been referred to OPPT  He reports more severe symptoms when rolling to the right vs the left  Pain  No pain reported    Social Support  Steps to enter house: yes  Stairs in house: yes   Lives in: multiple-level home  Lives with: spouse and adult children    Employment status: working (Mainly desk work)    Diagnostic Tests  No diagnostic tests performed  Patient Goals  Patient goals for therapy: improved balance and independence with ADLs/IADLs          Objective     Concurrent Complaints  Positive for tinnitus (>10 yr history)  Negative for headaches, nausea/motion sickness, visual change, memory loss, aural fullness and poor concentration    Active Range of Motion   Cervical/Thoracic Spine       Cervical    Flexion: Neck active flexion: WNL  Extension: Neck active extension: mod rest       Left lateral flexion: Neck active lateral bend left: Min rest       Right lateral flexion: Neck active lateral bend right: Min rest       Left rotation: Neck active rotation left: mod rest   Right rotation: Neck active rotation right: Mod rest              Ambulation     Observational Gait   Gait: within functional limits     Functional Assessment        Comments  Pt scored 24/24 on Dynamic Gait Index indicated no increased risk of falls  Neuro Exam:     Dizziness  Positive for disequilibrium, vertigo and light-headedness  Negative for rocking or swaying and diplopia  Exacerbating factors  Positive for rolling in bed, looking up, turning head and supine to/from sitting  Negative for bending over and walking  Symptoms   Duration: Vertigo lasting 15-20 seconds  Residiual dizziness for 3-4 minutes following    Frequency: Dependent on positional changes    Headaches   Patient reports headaches: No      Cervical exam   Modified VBI   Seated posture: forward head posture    Oculomotor exam   Oculomotor ROM: WNL  Smooth pursuits: within normal limits  Vertical saccades: normal  Horizontal saccades: normal    Positional testing   Una-Hallpike   Left posterior canal: WNL  Right posterior canal: torsional  Duration: 15 (seconds)    FOTO Score: 90         Precautions: None      Manuals 2/4       R-Epley 1x                               Neuro Re-Ed                                                                Ther Ex                                                                        Ther Activity                        Gait Training                        Modalities

## 2021-02-04 NOTE — ASSESSMENT & PLAN NOTE
Mild elevation in office today  Advise increase in hydration  Monitor for improvement in dizziness  Recommend pt take his BP at home periodically, record readings and return to the office to review results  Sodium reduction

## 2021-02-05 ENCOUNTER — OFFICE VISIT (OUTPATIENT)
Dept: PHYSICAL THERAPY | Facility: CLINIC | Age: 61
End: 2021-02-05
Payer: COMMERCIAL

## 2021-02-05 DIAGNOSIS — H81.13 BPV (BENIGN POSITIONAL VERTIGO), BILATERAL: Primary | ICD-10-CM

## 2021-02-05 PROCEDURE — 97140 MANUAL THERAPY 1/> REGIONS: CPT | Performed by: PHYSICAL THERAPIST

## 2021-02-05 NOTE — PROGRESS NOTES
Daily Note     Today's date: 2021  Patient name: Rashad Hammond  : 1960  MRN: [id number]  Referring provider: ISABEL Hudson  Dx:   Encounter Diagnosis     ICD-10-CM    1  BPV (benign positional vertigo), bilateral  H81 13        Start Time: 1745  Stop Time: 1830  Total time in clinic (min): 45 minutes    Subjective: Client reports feeling "unsteady" and "a little nauseous" but denies any "spinning" sensation  Objective: See treatment diary below      Assessment: Client with good tolerance to positional testing and CRT today  Noted improvements since IE as client only had subjective reports of dizziness today compared to nystagmus during IE  Client with less than 3 seconds worth of subjective dizziness during each trial  On last trial, he was able to demonstrate good carryover on how to complete self epley for home  He will benefit for skilled PT services to maximize his function  Plan: Continue per plan of care        Precautions: None      Manuals  2      R-Epley 1x x3 trials                              Neuro Re-Ed                                                                Ther Ex                                                                        Ther Activity        Education  On BPPV pathology,  reviewed self epley, reviewed post CRT do's-don'ts      Positional Testing  L Una hallpike (-) dizziness/nystagmus  R [place] hallpike subjective reports of dizziness x 3 seconds (-) nystagmus

## 2021-02-08 ENCOUNTER — OFFICE VISIT (OUTPATIENT)
Dept: PHYSICAL THERAPY | Facility: CLINIC | Age: 61
End: 2021-02-08
Payer: COMMERCIAL

## 2021-02-08 DIAGNOSIS — H81.13 BPV (BENIGN POSITIONAL VERTIGO), BILATERAL: Primary | ICD-10-CM

## 2021-02-08 PROCEDURE — 97110 THERAPEUTIC EXERCISES: CPT | Performed by: PHYSICAL THERAPIST

## 2021-02-08 PROCEDURE — 97112 NEUROMUSCULAR REEDUCATION: CPT | Performed by: PHYSICAL THERAPIST

## 2021-02-08 NOTE — PROGRESS NOTES
Daily Note     Today's date: 2021  Patient name: Zenia Tobar  : 1960  MRN: [id number]  Referring provider: ISABEL Barroso  Dx:   Encounter Diagnosis     ICD-10-CM    1  BPV (benign positional vertigo), bilateral  H81 13                   Subjective: Pt reports no vertigo but continued dizziness with head movements, positional changes and prolonged computer work  Objective: See treatment diary below      Assessment: Tolerated treatment well  Patient is showing no signs of vertigo but continued dizziness due to vestibular dysfunction  Plan: Continue per plan of care  Focus on vestibular habituation/adaptation training for positional changes and ocular motor training for dynamic visual acuity       Precautions: None      Manuals  2     R-Epley 1x x3 trials 2 bouts                             Neuro Re-Ed        [place]    2 cycles     Seated eyes opposite of head tracking X   3x10     Stand eyes opposite of head tracking X   3x10                                     Ther Ex                                                                        Ther Activity        Education  On BPPV pathology,  reviewed self epley, reviewed post CRT do's-don'ts Education on BPPV, habituation/adaptation exercises precautions     Positional Testing  L Una hallpike (-) dizziness/nystagmus  R Una hallpike subjective reports of dizziness x 3 seconds (-) nystagmus

## 2021-02-15 ENCOUNTER — OFFICE VISIT (OUTPATIENT)
Dept: PHYSICAL THERAPY | Facility: CLINIC | Age: 61
End: 2021-02-15
Payer: COMMERCIAL

## 2021-02-15 DIAGNOSIS — H81.13 BPV (BENIGN POSITIONAL VERTIGO), BILATERAL: Primary | ICD-10-CM

## 2021-02-15 PROCEDURE — 97112 NEUROMUSCULAR REEDUCATION: CPT | Performed by: PHYSICAL THERAPIST

## 2021-02-15 NOTE — PROGRESS NOTES
Daily Note - Discharge    Today's date: 2/15/2021  Patient name: Ellen Monroy  : 1960  MRN: [id number]  Referring provider: ISABEL Ansari  Dx:   Encounter Diagnosis     ICD-10-CM    1  BPV (benign positional vertigo), bilateral  H81 13        Start Time: 1234  Stop Time: 1257  Total time in clinic (min): 23 minutes    Subjective: Client denies any further bouts of vertigo or dizziness since last week  Objective:     2/15/21:  Foto completed: improved from 89 56% to 96 7%    Positional Testing:  DixHallpike: (-) bilaterally  Roll Test: (-) bilaterally    VOR x 1: 100 bpm H and V (-) dizziness    mCTSIB  EO firm: 30 sec  EC firm: 30 sec  EO foam: 30 sec  EC foam: 30 sec    Goals  STG 2-4 Weeks  1  Pt will be able to roll in bed without symptoms of vertigo  MET  2  Pt will be able to transfer supine to sit without provocation of vertigo  MET  3  Pt will be I with Home Epley Maneuver  MET    LTG  Not applicable at this time  Assessment: Tolerated treatment well  Client without reports of dizziness since last Wednesday  He confirms compliance with VOR habituation training and self Epley  Client without any residual dizziness at this time and confirms (-) symptoms of vertigo  Able to complete mCTSIB without LOB and appropriate VOR without onset of dizziness  This PT and client in agreement for D/C today due to resolution of symptoms  All current questions address  Plan: Discharge due to resolution of symptoms       Precautions: None      Manuals 2/4 2/5 2/8     R-Epley 1x x3 trials 2 bouts                             Neuro Re-Ed        [place]    2 cycles     Seated eyes opposite of head tracking X   3x10     Stand eyes opposite of head tracking X   3x10                                     Ther Ex                                                                        Ther Activity        Education  On BPPV pathology,  reviewed self epley, reviewed post CRT do's-don'ts Education on BPPV, habituation/adaptation exercises precautions     Positional Testing  L Una hallpike (-) dizziness/nystagmus  R Una hallpike subjective reports of dizziness x 3 seconds (-) nystagmus

## 2021-02-22 ENCOUNTER — CLINICAL SUPPORT (OUTPATIENT)
Dept: FAMILY MEDICINE CLINIC | Facility: CLINIC | Age: 61
End: 2021-02-22
Payer: COMMERCIAL

## 2021-02-22 VITALS — TEMPERATURE: 97.2 F

## 2021-02-22 DIAGNOSIS — Z23 NEED FOR SHINGLES VACCINE: Primary | ICD-10-CM

## 2021-02-22 PROCEDURE — 90471 IMMUNIZATION ADMIN: CPT

## 2021-02-22 PROCEDURE — 90750 HZV VACC RECOMBINANT IM: CPT

## 2021-03-10 DIAGNOSIS — Z23 ENCOUNTER FOR IMMUNIZATION: ICD-10-CM

## 2021-03-23 ENCOUNTER — IMMUNIZATIONS (OUTPATIENT)
Dept: FAMILY MEDICINE CLINIC | Facility: HOSPITAL | Age: 61
End: 2021-03-23

## 2021-03-23 DIAGNOSIS — Z23 ENCOUNTER FOR IMMUNIZATION: Primary | ICD-10-CM

## 2021-03-23 PROCEDURE — 91300 SARS-COV-2 / COVID-19 MRNA VACCINE (PFIZER-BIONTECH) 30 MCG: CPT

## 2021-03-23 PROCEDURE — 0001A SARS-COV-2 / COVID-19 MRNA VACCINE (PFIZER-BIONTECH) 30 MCG: CPT

## 2021-03-26 DIAGNOSIS — E78.49 FAMILIAL HYPERLIPIDEMIA: ICD-10-CM

## 2021-03-26 RX ORDER — ATORVASTATIN CALCIUM 10 MG/1
TABLET, FILM COATED ORAL
Qty: 90 TABLET | Refills: 3 | Status: SHIPPED | OUTPATIENT
Start: 2021-03-26 | End: 2022-06-08

## 2021-04-15 ENCOUNTER — IMMUNIZATIONS (OUTPATIENT)
Dept: FAMILY MEDICINE CLINIC | Facility: HOSPITAL | Age: 61
End: 2021-04-15

## 2021-04-15 DIAGNOSIS — Z23 ENCOUNTER FOR IMMUNIZATION: Primary | ICD-10-CM

## 2021-04-15 PROCEDURE — 0002A SARS-COV-2 / COVID-19 MRNA VACCINE (PFIZER-BIONTECH) 30 MCG: CPT

## 2021-04-15 PROCEDURE — 91300 SARS-COV-2 / COVID-19 MRNA VACCINE (PFIZER-BIONTECH) 30 MCG: CPT

## 2021-05-13 DIAGNOSIS — R73.09 ELEVATED GLUCOSE: ICD-10-CM

## 2021-05-13 DIAGNOSIS — Z00.00 PREVENTATIVE HEALTH CARE: Primary | ICD-10-CM

## 2021-05-13 DIAGNOSIS — N40.0 ENLARGED PROSTATE: ICD-10-CM

## 2021-05-13 DIAGNOSIS — Z12.5 SCREENING FOR PROSTATE CANCER: ICD-10-CM

## 2021-05-13 DIAGNOSIS — E78.49 FAMILIAL HYPERLIPIDEMIA: ICD-10-CM

## 2021-05-13 DIAGNOSIS — I10 ESSENTIAL HYPERTENSION: ICD-10-CM

## 2021-05-13 DIAGNOSIS — Z13.29 SCREENING FOR THYROID DISORDER: ICD-10-CM

## 2021-06-10 LAB
ALBUMIN SERPL-MCNC: 4.5 G/DL (ref 3.8–4.8)
ALBUMIN/GLOB SERPL: 1.8 {RATIO} (ref 1.2–2.2)
ALP SERPL-CCNC: 63 IU/L (ref 48–121)
ALT SERPL-CCNC: 24 IU/L (ref 0–44)
AST SERPL-CCNC: 22 IU/L (ref 0–40)
BASOPHILS # BLD AUTO: 0.1 X10E3/UL (ref 0–0.2)
BASOPHILS NFR BLD AUTO: 1 %
BILIRUB SERPL-MCNC: 1.6 MG/DL (ref 0–1.2)
BUN SERPL-MCNC: 12 MG/DL (ref 8–27)
BUN/CREAT SERPL: 11 (ref 10–24)
CALCIUM SERPL-MCNC: 9.9 MG/DL (ref 8.6–10.2)
CHLORIDE SERPL-SCNC: 101 MMOL/L (ref 96–106)
CHOLEST SERPL-MCNC: 157 MG/DL (ref 100–199)
CHOLEST/HDLC SERPL: 4.1 RATIO (ref 0–5)
CO2 SERPL-SCNC: 30 MMOL/L (ref 20–29)
CREAT SERPL-MCNC: 1.13 MG/DL (ref 0.76–1.27)
EOSINOPHIL # BLD AUTO: 0.2 X10E3/UL (ref 0–0.4)
EOSINOPHIL NFR BLD AUTO: 4 %
ERYTHROCYTE [DISTWIDTH] IN BLOOD BY AUTOMATED COUNT: 12.5 % (ref 11.6–15.4)
EST. AVERAGE GLUCOSE BLD GHB EST-MCNC: 131 MG/DL
GLOBULIN SER-MCNC: 2.5 G/DL (ref 1.5–4.5)
GLUCOSE SERPL-MCNC: 97 MG/DL (ref 65–99)
HBA1C MFR BLD: 6.2 % (ref 4.8–5.6)
HCT VFR BLD AUTO: 46.1 % (ref 37.5–51)
HDLC SERPL-MCNC: 38 MG/DL
HGB BLD-MCNC: 15.9 G/DL (ref 13–17.7)
IMM GRANULOCYTES # BLD: 0 X10E3/UL (ref 0–0.1)
IMM GRANULOCYTES NFR BLD: 0 %
LDLC SERPL CALC-MCNC: 97 MG/DL (ref 0–99)
LYMPHOCYTES # BLD AUTO: 1.8 X10E3/UL (ref 0.7–3.1)
LYMPHOCYTES NFR BLD AUTO: 35 %
MCH RBC QN AUTO: 31.3 PG (ref 26.6–33)
MCHC RBC AUTO-ENTMCNC: 34.5 G/DL (ref 31.5–35.7)
MCV RBC AUTO: 91 FL (ref 79–97)
MONOCYTES # BLD AUTO: 0.6 X10E3/UL (ref 0.1–0.9)
MONOCYTES NFR BLD AUTO: 12 %
NEUTROPHILS # BLD AUTO: 2.5 X10E3/UL (ref 1.4–7)
NEUTROPHILS NFR BLD AUTO: 48 %
PLATELET # BLD AUTO: 171 X10E3/UL (ref 150–450)
POTASSIUM SERPL-SCNC: 5.1 MMOL/L (ref 3.5–5.2)
PROT SERPL-MCNC: 7 G/DL (ref 6–8.5)
PSA SERPL-MCNC: 1.8 NG/ML (ref 0–4)
RBC # BLD AUTO: 5.08 X10E6/UL (ref 4.14–5.8)
SL AMB EGFR AFRICAN AMERICAN: 81 ML/MIN/1.73
SL AMB EGFR NON AFRICAN AMERICAN: 70 ML/MIN/1.73
SL AMB REFLEX CRITERIA: NORMAL
SL AMB VLDL CHOLESTEROL CALC: 22 MG/DL (ref 5–40)
SODIUM SERPL-SCNC: 140 MMOL/L (ref 134–144)
TRIGL SERPL-MCNC: 123 MG/DL (ref 0–149)
TSH SERPL DL<=0.005 MIU/L-ACNC: 0.88 UIU/ML (ref 0.45–4.5)
WBC # BLD AUTO: 5.2 X10E3/UL (ref 3.4–10.8)

## 2021-06-16 ENCOUNTER — OFFICE VISIT (OUTPATIENT)
Dept: FAMILY MEDICINE CLINIC | Facility: CLINIC | Age: 61
End: 2021-06-16
Payer: COMMERCIAL

## 2021-06-16 VITALS
HEIGHT: 72 IN | DIASTOLIC BLOOD PRESSURE: 88 MMHG | RESPIRATION RATE: 12 BRPM | HEART RATE: 80 BPM | BODY MASS INDEX: 30.61 KG/M2 | WEIGHT: 226 LBS | OXYGEN SATURATION: 98 % | SYSTOLIC BLOOD PRESSURE: 128 MMHG | TEMPERATURE: 97.6 F

## 2021-06-16 DIAGNOSIS — N40.0 ENLARGED PROSTATE: ICD-10-CM

## 2021-06-16 DIAGNOSIS — I10 ESSENTIAL HYPERTENSION: ICD-10-CM

## 2021-06-16 DIAGNOSIS — Z00.00 ENCOUNTER FOR ANNUAL GENERAL MEDICAL EXAMINATION WITHOUT ABNORMAL FINDINGS IN ADULT: Primary | ICD-10-CM

## 2021-06-16 DIAGNOSIS — E66.09 CLASS 1 OBESITY DUE TO EXCESS CALORIES WITH SERIOUS COMORBIDITY AND BODY MASS INDEX (BMI) OF 30.0 TO 30.9 IN ADULT: ICD-10-CM

## 2021-06-16 DIAGNOSIS — I10 ELEVATED BLOOD PRESSURE READING IN OFFICE WITH DIAGNOSIS OF HYPERTENSION: ICD-10-CM

## 2021-06-16 DIAGNOSIS — E78.49 FAMILIAL HYPERLIPIDEMIA: ICD-10-CM

## 2021-06-16 DIAGNOSIS — N20.0 RENAL CALCULI: ICD-10-CM

## 2021-06-16 DIAGNOSIS — R73.03 PREDIABETES: ICD-10-CM

## 2021-06-16 PROBLEM — L30.9 ECZEMA: Status: RESOLVED | Noted: 2020-06-15 | Resolved: 2021-06-16

## 2021-06-16 PROBLEM — Z90.49 S/P PARTIAL RESECTION OF COLON: Status: ACTIVE | Noted: 2021-06-16

## 2021-06-16 PROBLEM — D22.9 ATYPICAL NEVI: Status: RESOLVED | Noted: 2020-06-15 | Resolved: 2021-06-16

## 2021-06-16 PROBLEM — Z80.7 FAMILY HISTORY OF MULTIPLE MYELOMA: Status: ACTIVE | Noted: 2021-06-16

## 2021-06-16 PROCEDURE — 3008F BODY MASS INDEX DOCD: CPT | Performed by: NURSE PRACTITIONER

## 2021-06-16 PROCEDURE — 99396 PREV VISIT EST AGE 40-64: CPT | Performed by: NURSE PRACTITIONER

## 2021-06-16 PROCEDURE — 1036F TOBACCO NON-USER: CPT | Performed by: NURSE PRACTITIONER

## 2021-06-16 PROCEDURE — 3725F SCREEN DEPRESSION PERFORMED: CPT | Performed by: NURSE PRACTITIONER

## 2021-06-16 PROCEDURE — 3074F SYST BP LT 130 MM HG: CPT | Performed by: NURSE PRACTITIONER

## 2021-06-16 PROCEDURE — 3079F DIAST BP 80-89 MM HG: CPT | Performed by: NURSE PRACTITIONER

## 2021-06-16 PROCEDURE — 93000 ELECTROCARDIOGRAM COMPLETE: CPT | Performed by: NURSE PRACTITIONER

## 2021-06-16 NOTE — PROGRESS NOTES
FAMILY PRACTICE HEALTH MAINTENANCE OFFICE VISIT  [facility]"" 96 PHYSICIANS    NAME: Beti Kevin  AGE: 63 y o  SEX: male  : 1960     DATE: 2021    Assessment and Plan     1  Encounter for annual general medical examination without abnormal findings in adult  Comments:  Age appropriate screenings and recommendations discussed  Fasting lab work reviewed  2  Prediabetes  Assessment & Plan:  A1c is 6 2 - discussed in detail with pt  Recommend metformin therapy at this time and pt is agreeable to this but would like to try an additional 3 months of nutrition and exercise  We discussed A1c goal of 5 8 and if he continues to struggle with weight maintenance, then start metformin at that time  It appears that even with slight weight increases, he develops insulin resistance  Advise strict adherence to diabetic diet  3  Elevated blood pressure reading in office with diagnosis of hypertension  -     Stress test only, exercise; Future; Expected date: 2021  -     POCT ECG    4  Essential hypertension  -     POCT ECG    5  Familial hyperlipidemia  Assessment & Plan:  Lipid panel wnl  Low HDL and encourage increased intake of healthy fats  Taking atorvastatin 10 mg nightly without issues  Stable, no changes today  Orders:  -     POCT ECG    6  Class 1 obesity due to excess calories with serious comorbidity and body mass index (BMI) of 30 0 to 30 9 in adult  Assessment & Plan:  Encourage exercise and weight loss as tolerated  History of gastric sleeve surgery  Pt reports weight loss occurs when keeping calories at 1000 per day  We discussed necessary nutrients, monitor for mal-nutrition  7  Renal calculi  Assessment & Plan:  Denies any issues at this time  Following up with Dr Janice Carlson, urology     Reviewed CT from 2019        8  Enlarged prostate      · Patient Counseling:   · Nutrition: Stressed importance of a well balanced diet, moderation of sodium/saturated fat, caloric balance and sufficient intake of fiber  · Exercise: Stressed the importance of regular exercise with a goal of 150 minutes per week  · Dental Health: Discussed daily flossing and brushing and regular dental visits   · Alcohol Use:  Recommended moderation of alcohol intake  · Injury Prevention: Discussed Safety Belts, Safety Helmets, and Smoke Detectors    · Immunizations reviewed: Up To Date  · Discussed benefits of:  Colon Cancer Screening, Prostate Cancer Screening  and Screening labs   BMI Counseling: Body mass index is 30 44 kg/m²  Discussed with patient's BMI with him  The BMI is above normal  Nutrition recommendations include reducing portion sizes, decreasing overall calorie intake, 3-5 servings of fruits/vegetables daily, reducing fast food intake, consuming healthier snacks, decreasing soda and/or juice intake, moderation in carbohydrate intake, increasing intake of lean protein, reducing intake of saturated fat and trans fat and reducing intake of cholesterol  Exercise recommendations include moderate aerobic physical activity for 150 minutes/week and exercising 3-5 times per week  Return in about 3 months (around 9/16/2021) for Recheck A1c          Chief Complaint     Chief Complaint   Patient presents with    Annual Exam       History of Present Illness     HPI    Well Adult Physical   Patient here for a comprehensive physical exam       Diet and Physical Activity  Diet: well balanced diet  Exercise: intermittently      Depression Screen  PHQ-9 Depression Screening    PHQ-9:   Frequency of the following problems over the past two weeks:      Little interest or pleasure in doing things: 0 - not at all  Feeling down, depressed, or hopeless: 0 - not at all  PHQ-2 Score: 0          General Health  Hearing: Normal:  bilateral  Vision: goes for regular eye exams  Dental: regular dental visits    Reproductive Health  FOLLOWING UP WITH UROLOGY      The following portions of the patient's history were reviewed and updated as appropriate: allergies, current medications, past family history, past medical history, past social history, past surgical history and problem list     Review of Systems     Review of Systems   Constitutional: Negative for diaphoresis, fatigue and fever  HENT: Negative for ear pain and hearing loss  Eyes: Negative for pain and visual disturbance  Respiratory: Negative for chest tightness and shortness of breath  Cardiovascular: Negative for chest pain, palpitations and leg swelling  Gastrointestinal: Negative for abdominal pain, constipation and diarrhea  Genitourinary: Negative for difficulty urinating  Musculoskeletal: Negative for arthralgias and myalgias  Skin: Negative for rash  Neurological: Negative for dizziness, numbness and headaches  Psychiatric/Behavioral: Negative for sleep disturbance  Past Medical History     Past Medical History:   Diagnosis Date    Cholelithiasis     Hematuria     Hypertension     Renal calculi 8/5/4610    Renal colic        Past Surgical History     Past Surgical History:   Procedure Laterality Date    ARTHROTOMY KNEE      with Medial Meniscectomy    COLECTOMY  2009    partial    GALLBLADDER SURGERY      LITHOTRIPSY  2000    Renal       Social History     Social History     Socioeconomic History    Marital status: /Civil Union     Spouse name: None    Number of children: None    Years of education: None    Highest education level: None   Occupational History    None   Tobacco Use    Smoking status: Never Smoker    Smokeless tobacco: Never Used   Vaping Use    Vaping Use: Never used   Substance and Sexual Activity    Alcohol use:  Yes    Drug use: Never    Sexual activity: None   Other Topics Concern    None   Social History Narrative    Obesity in [employer], in pertinent negatives in Epic        Active advance directive    Dental care, regularly    No caffeine use     Social Determinants of Health     Financial Resource Strain:     Difficulty of Paying Living Expenses:    Food Insecurity:     Worried About Running Out of Food in the Last Year:     [address] in the Last Year:    Transportation Needs:     Lack of Transportation (Medical):      Lack of Transportation (Non-Medical):    Physical Activity:     Days of Exercise per Week:     Minutes of Exercise per Session:    Stress:     Feeling of Stress :    Social Connections:     Frequency of Communication with Friends and Family:     Frequency of Social Gatherings with Friends and Family:     Attends Sikhism Services:     Active Member of Clubs or Organizations:     Attends Club or Organization Meetings:     Marital Status:    Intimate Partner Violence:     Fear of Current or Ex-Partner:     Emotionally Abused:     Physically Abused:     Sexually Abused:        Family History     Family History   Problem Relation Age of Onset    Hypertension Mother     Hyperlipidemia Mother     Hypothyroidism Mother     Skin cancer Father         forehead & Nose    Other Paternal Grandmother         Nervous Breakdown    Alcohol abuse Paternal Grandfather     Alcohol abuse Other     Alcohol abuse Family     Skin cancer Paternal Uncle         forehead & Nose    Substance Abuse Neg Hx     Mental illness Neg Hx        Current Medications       Current Outpatient Medications:     amitriptyline (ELAVIL) 10 mg tablet, , Disp: , Rfl:     atorvastatin (LIPITOR) 10 mg tablet, TAKE 1 TABLET DAILY, Disp: 90 tablet, Rfl: 3    candesartan-hydrochlorothiazide (ATACAND HCT) 32-12 5 MG per tablet, Take 1 tablet by mouth daily, Disp: 90 tablet, Rfl: 3    meclizine (ANTIVERT) 12 5 MG tablet, Take 1 tablet (12 5 mg total) by mouth 3 (three) times a day as needed for dizziness, Disp: 30 tablet, Rfl: 0    tamsulosin (FLOMAX) 0 4 mg, , Disp: , Rfl:      Allergies     Allergies   Allergen Reactions    Nuts - Food Allergy Blisters Annotation - [id number]: only walnuts       Objective     /90 (BP Location: Left arm, Patient Position: Sitting, Cuff Size: Adult)   Pulse 80   Temp 97 6 °F (36 4 °C) (Temporal)   Resp 12   Ht 6' 0 25" (1 835 m)   Wt 103 kg (226 lb)   SpO2 98%   BMI 30 44 kg/m²      Physical Exam  Vitals reviewed  Constitutional:       General: He is not in acute distress  Appearance: Normal appearance  He is well-developed  He is not diaphoretic  HENT:      Head: Normocephalic and atraumatic  Right Ear: Tympanic membrane, ear canal and external ear normal       Left Ear: Tympanic membrane, ear canal and external ear normal    Eyes:      General: Lids are normal       Extraocular Movements: Extraocular movements intact  Conjunctiva/sclera: Conjunctivae normal       Pupils: Pupils are equal, round, and reactive to light  Pupils are equal    Neck:      Thyroid: No thyroid mass or thyromegaly  Vascular: No carotid bruit  Cardiovascular:      Rate and Rhythm: Normal rate and regular rhythm  Pulses: Normal pulses  Heart sounds: Normal heart sounds, S1 normal and S2 normal  No murmur heard  Pulmonary:      Effort: Pulmonary effort is normal       Breath sounds: Normal breath sounds  No decreased breath sounds, wheezing, rhonchi or rales  Abdominal:      General: Bowel sounds are normal  There is no distension  Palpations: Abdomen is soft  There is no hepatomegaly or splenomegaly  Tenderness: There is no abdominal tenderness  Musculoskeletal:         General: No tenderness or deformity  Normal range of motion  Cervical back: Full passive range of motion without pain, normal range of motion and neck supple  Right lower leg: No edema  Left lower leg: No edema  Lymphadenopathy:      Cervical: No cervical adenopathy  Upper Body:      Right upper body: No supraclavicular adenopathy  Left upper body: No supraclavicular adenopathy     Skin:     General: Skin is warm and dry  Findings: No rash  Neurological:      General: No focal deficit present  Mental Status: He is alert and oriented to person, place, and time  Cranial Nerves: No cranial nerve deficit  Motor: Motor function is intact  Coordination: Coordination normal       Deep Tendon Reflexes: Reflexes are normal and symmetric  Psychiatric:         Speech: Speech normal          Behavior: Behavior normal          Thought Content:  Thought content normal          Judgment: Judgment normal                Zoey Vidal, [address] [place] [address],[address]

## 2021-06-16 NOTE — ASSESSMENT & PLAN NOTE
A1c is 6 2 - discussed in detail with pt  Recommend metformin therapy at this time and pt is agreeable to this but would like to try an additional 3 months of nutrition and exercise  We discussed A1c goal of 5 8 and if he continues to struggle with weight maintenance, then start metformin at that time  It appears that even with slight weight increases, he develops insulin resistance  Advise strict adherence to diabetic diet

## 2021-06-16 NOTE — ASSESSMENT & PLAN NOTE
Denies any issues at this time  Following up with Dr Mary Rodriguez, urology  Reviewed CT from 2019

## 2021-06-16 NOTE — ASSESSMENT & PLAN NOTE
Encourage exercise and weight loss as tolerated  History of gastric sleeve surgery  Pt reports weight loss occurs when keeping calories at 1000 per day  We discussed necessary nutrients, monitor for mal-nutrition

## 2021-06-16 NOTE — ASSESSMENT & PLAN NOTE
Lipid panel wnl  Low HDL and encourage increased intake of healthy fats  Taking atorvastatin 10 mg nightly without issues  Stable, no changes today

## 2021-07-06 ENCOUNTER — HOSPITAL ENCOUNTER (OUTPATIENT)
Dept: NON INVASIVE DIAGNOSTICS | Facility: HOSPITAL | Age: 61
Discharge: HOME | End: 2021-07-06
Payer: COMMERCIAL

## 2021-07-06 DIAGNOSIS — I10 ELEVATED BLOOD PRESSURE READING IN OFFICE WITH DIAGNOSIS OF HYPERTENSION: ICD-10-CM

## 2021-07-06 LAB
CHEST PAIN STATEMENT: NORMAL
MAX DIASTOLIC BP: 84 MMHG
MAX HEART RATE: 160 BPM
MAX PREDICTED HEART RATE: 158 BPM
MAX. SYSTOLIC BP: 206 MMHG
PROTOCOL NAME: NORMAL
REASON FOR TERMINATION: NORMAL
TARGET HR FORMULA: NORMAL
TEST INDICATION: NORMAL
TIME IN EXERCISE PHASE: NORMAL

## 2021-07-06 PROCEDURE — 93017 CV STRESS TEST TRACING ONLY: CPT

## 2021-07-07 LAB
ATRIAL RATE: 74 BPM
P AXIS: 12 DEGREES
PR INTERVAL: 140 MS
QRS AXIS: -51 DEGREES
QRSD INTERVAL: 164 MS
QT INTERVAL: 418 MS
QTC INTERVAL: 463 MS
T WAVE AXIS: 31 DEGREES
VENTRICULAR RATE: 74 BPM

## 2021-07-07 PROCEDURE — 93016 CV STRESS TEST SUPVJ ONLY: CPT | Performed by: INTERNAL MEDICINE

## 2021-07-07 PROCEDURE — 93018 CV STRESS TEST I&R ONLY: CPT | Performed by: INTERNAL MEDICINE

## 2021-07-07 PROCEDURE — 93010 ELECTROCARDIOGRAM REPORT: CPT | Performed by: INTERNAL MEDICINE

## 2021-07-30 ENCOUNTER — HOSPITAL ENCOUNTER (OUTPATIENT)
Dept: RADIOLOGY | Facility: HOSPITAL | Age: 61
Discharge: HOME | End: 2021-07-30
Attending: SPECIALIST
Payer: COMMERCIAL

## 2021-07-30 DIAGNOSIS — N20.0 URIC ACID NEPHROLITHIASIS: ICD-10-CM

## 2021-07-30 PROCEDURE — 74018 RADEX ABDOMEN 1 VIEW: CPT

## 2021-09-13 RX ORDER — CANDESARTAN CILEXETIL AND HYDROCHLOROTHIAZIDE 16; 12.5 MG/1; MG/1
TABLET ORAL DAILY
COMMUNITY
End: 2021-09-15

## 2021-09-14 RX ORDER — SILODOSIN 8 MG/1
CAPSULE ORAL
COMMUNITY
Start: 2021-08-30

## 2021-09-15 ENCOUNTER — OFFICE VISIT (OUTPATIENT)
Dept: FAMILY MEDICINE CLINIC | Facility: CLINIC | Age: 61
End: 2021-09-15
Payer: COMMERCIAL

## 2021-09-15 VITALS
RESPIRATION RATE: 16 BRPM | WEIGHT: 229 LBS | SYSTOLIC BLOOD PRESSURE: 126 MMHG | OXYGEN SATURATION: 96 % | DIASTOLIC BLOOD PRESSURE: 70 MMHG | HEART RATE: 76 BPM | HEIGHT: 72 IN | TEMPERATURE: 98 F | BODY MASS INDEX: 31.02 KG/M2

## 2021-09-15 DIAGNOSIS — E66.09 CLASS 1 OBESITY DUE TO EXCESS CALORIES WITH SERIOUS COMORBIDITY AND BODY MASS INDEX (BMI) OF 30.0 TO 30.9 IN ADULT: ICD-10-CM

## 2021-09-15 DIAGNOSIS — R73.03 PREDIABETES: Primary | ICD-10-CM

## 2021-09-15 DIAGNOSIS — Z23 NEED FOR INFLUENZA VACCINATION: ICD-10-CM

## 2021-09-15 DIAGNOSIS — Z12.12 SCREENING FOR COLORECTAL CANCER: ICD-10-CM

## 2021-09-15 DIAGNOSIS — I10 ESSENTIAL HYPERTENSION: ICD-10-CM

## 2021-09-15 DIAGNOSIS — Z12.11 SCREENING FOR COLORECTAL CANCER: ICD-10-CM

## 2021-09-15 LAB — SL AMB POCT HEMOGLOBIN AIC: 5.8 (ref ?–6.5)

## 2021-09-15 PROCEDURE — 3078F DIAST BP <80 MM HG: CPT | Performed by: NURSE PRACTITIONER

## 2021-09-15 PROCEDURE — 90471 IMMUNIZATION ADMIN: CPT

## 2021-09-15 PROCEDURE — 99214 OFFICE O/P EST MOD 30 MIN: CPT | Performed by: NURSE PRACTITIONER

## 2021-09-15 PROCEDURE — 83036 HEMOGLOBIN GLYCOSYLATED A1C: CPT | Performed by: NURSE PRACTITIONER

## 2021-09-15 PROCEDURE — 1036F TOBACCO NON-USER: CPT | Performed by: NURSE PRACTITIONER

## 2021-09-15 PROCEDURE — 90682 RIV4 VACC RECOMBINANT DNA IM: CPT

## 2021-09-15 PROCEDURE — 3008F BODY MASS INDEX DOCD: CPT | Performed by: NURSE PRACTITIONER

## 2021-09-15 PROCEDURE — 3074F SYST BP LT 130 MM HG: CPT | Performed by: NURSE PRACTITIONER

## 2021-09-15 NOTE — ASSESSMENT & PLAN NOTE
A1c improved from 6 2 to 5 8 today  He continues to struggle with food choices and increasing physical activity  Reports motivation for change  Counseling provided  Advise 3 month recheck of BMI and A1c

## 2021-09-15 NOTE — PROGRESS NOTES
Assessment/Plan:    1  Prediabetes  Assessment & Plan:  A1c improved from 6 2 to 5 8 today  He continues to struggle with food choices and increasing physical activity  Reports motivation for change  Counseling provided  Advise 3 month recheck of BMI and A1c  Orders:  -     POCT hemoglobin A1c    2  Class 1 obesity due to excess calories with serious comorbidity and body mass index (BMI) of 30 0 to 30 9 in adult  Assessment & Plan:  Weight goal per pt is 220 lbs  Recent weight gain, repottt      3  Essential hypertension  Assessment & Plan:  BP wnl in office today  Tolerating medications without issues  Stable  4  Need for influenza vaccination  -     influenza vaccine, quadrivalent, recombinant, PF, 0 5 mL, for patients 18 yr+ (FLUBLOK)    5  Screening for colorectal cancer  -     Ambulatory referral to Gastroenterology; Future    BMI Counseling: Body mass index is 30 84 kg/m²  The BMI is above normal  Nutrition recommendations include encouraging healthy choices of fruits and vegetables and moderation in carbohydrate intake  Exercise recommendations include exercising 3-5 times per week  Rationale for BMI follow-up plan is due to patient being overweight or obese  Return in about 3 months (around 12/15/2021) for Recheck, Diabetic Follow Up  Subjective:      Patient ID: Baldo Pretty is a 57 y o  male  Chief Complaint   Patient presents with    Follow-up     THREE MONTH DM       Karime Newton is a 57year old male with history of gastric sleeve surgery, obesity and prediabetes who returns to the office for a 3 month recheck of A1c  Pt reports he has not had a good summer as far as nutrition and exercise go  Reports he had very many bad days  States he struggles with eating chips because they do not usually fill him up like denser foods do  Reports he no longer follows up with gastric surgeon as he has been discharged from their care due to his progress   Denies chest pain, shortness of breath, n/v/d  The following portions of the patient's history were reviewed and updated as appropriate: allergies, current medications, past family history, past medical history, past social history, past surgical history and problem list     Review of Systems   Constitutional: Negative for chills, fatigue and fever  Respiratory: Negative for cough, chest tightness and shortness of breath  Cardiovascular: Negative for chest pain and leg swelling  Neurological: Negative for numbness  Current Outpatient Medications   Medication Sig Dispense Refill    amitriptyline (ELAVIL) 10 mg tablet       atorvastatin (LIPITOR) 10 mg tablet TAKE 1 TABLET DAILY 90 tablet 3    candesartan-hydrochlorothiazide (ATACAND HCT) 32-12 5 MG per tablet Take 1 tablet by mouth daily 90 tablet 3    Silodosin 8 MG CAPS       tamsulosin (FLOMAX) 0 4 mg  (Patient not taking: Reported on 9/15/2021)       No current facility-administered medications for this visit  Objective:    /70   Pulse 76   Temp 98 °F (36 7 °C) (Temporal)   Resp 16   Ht 6' 0 25" (1 835 m)   Wt 104 kg (229 lb)   SpO2 96%   BMI 30 84 kg/m²        Physical Exam  Vitals reviewed  Constitutional:       General: He is not in acute distress  Appearance: Normal appearance  He is well-developed  He is obese  He is not diaphoretic  HENT:      Head: Normocephalic and atraumatic  Eyes:      General:         Right eye: No discharge  Left eye: No discharge  Conjunctiva/sclera: Conjunctivae normal    Neck:      Thyroid: No thyromegaly  Cardiovascular:      Rate and Rhythm: Normal rate and regular rhythm  Heart sounds: Normal heart sounds  Pulmonary:      Effort: Pulmonary effort is normal  No respiratory distress  Breath sounds: Normal breath sounds  No decreased breath sounds, wheezing, rhonchi or rales  Musculoskeletal:      Cervical back: Normal range of motion and neck supple     Lymphadenopathy: Cervical: No cervical adenopathy  Skin:     General: Skin is warm and dry  Findings: No rash  Neurological:      Mental Status: He is alert and oriented to person, place, and time  Psychiatric:         Behavior: Behavior normal          Thought Content:  Thought content normal          Judgment: Judgment normal              ISABEL Lerner

## 2021-12-15 ENCOUNTER — OFFICE VISIT (OUTPATIENT)
Dept: FAMILY MEDICINE CLINIC | Facility: CLINIC | Age: 61
End: 2021-12-15
Payer: COMMERCIAL

## 2021-12-15 VITALS
BODY MASS INDEX: 31.29 KG/M2 | TEMPERATURE: 97.3 F | OXYGEN SATURATION: 99 % | DIASTOLIC BLOOD PRESSURE: 88 MMHG | HEART RATE: 72 BPM | RESPIRATION RATE: 12 BRPM | HEIGHT: 72 IN | WEIGHT: 231 LBS | SYSTOLIC BLOOD PRESSURE: 140 MMHG

## 2021-12-15 DIAGNOSIS — Z98.84 HISTORY OF BARIATRIC SURGERY: ICD-10-CM

## 2021-12-15 DIAGNOSIS — E66.09 CLASS 1 OBESITY DUE TO EXCESS CALORIES WITH SERIOUS COMORBIDITY AND BODY MASS INDEX (BMI) OF 30.0 TO 30.9 IN ADULT: ICD-10-CM

## 2021-12-15 DIAGNOSIS — I10 PRIMARY HYPERTENSION: ICD-10-CM

## 2021-12-15 DIAGNOSIS — R73.03 PREDIABETES: Primary | ICD-10-CM

## 2021-12-15 LAB — SL AMB POCT HEMOGLOBIN AIC: 5.8 (ref ?–6.5)

## 2021-12-15 PROCEDURE — 3008F BODY MASS INDEX DOCD: CPT | Performed by: NURSE PRACTITIONER

## 2021-12-15 PROCEDURE — 1036F TOBACCO NON-USER: CPT | Performed by: NURSE PRACTITIONER

## 2021-12-15 PROCEDURE — 3077F SYST BP >= 140 MM HG: CPT | Performed by: NURSE PRACTITIONER

## 2021-12-15 PROCEDURE — 83036 HEMOGLOBIN GLYCOSYLATED A1C: CPT | Performed by: NURSE PRACTITIONER

## 2021-12-15 PROCEDURE — 3079F DIAST BP 80-89 MM HG: CPT | Performed by: NURSE PRACTITIONER

## 2021-12-15 PROCEDURE — 99214 OFFICE O/P EST MOD 30 MIN: CPT | Performed by: NURSE PRACTITIONER

## 2022-02-23 ENCOUNTER — TELEPHONE (OUTPATIENT)
Dept: FAMILY MEDICINE CLINIC | Facility: CLINIC | Age: 62
End: 2022-02-23

## 2022-02-23 NOTE — TELEPHONE ENCOUNTER
Ashley Davis is scheduled to come in on 3/15 for A1C check and CPX in June  The nurses put in bw orders for A1C, lipids, CMP  Were they for June's appt (they usually do not do them that far ahead)  He stated he usually only does a finger stick in the office  Do you want the bw for the 3/15 appt?

## 2022-03-15 ENCOUNTER — OFFICE VISIT (OUTPATIENT)
Dept: FAMILY MEDICINE CLINIC | Facility: CLINIC | Age: 62
End: 2022-03-15
Payer: COMMERCIAL

## 2022-03-15 VITALS
DIASTOLIC BLOOD PRESSURE: 84 MMHG | TEMPERATURE: 97.2 F | SYSTOLIC BLOOD PRESSURE: 126 MMHG | HEART RATE: 100 BPM | HEIGHT: 72 IN | OXYGEN SATURATION: 97 % | BODY MASS INDEX: 31.15 KG/M2 | WEIGHT: 230 LBS | RESPIRATION RATE: 16 BRPM

## 2022-03-15 DIAGNOSIS — N40.1 BENIGN PROSTATIC HYPERPLASIA WITH INCOMPLETE BLADDER EMPTYING: ICD-10-CM

## 2022-03-15 DIAGNOSIS — R73.03 PREDIABETES: Primary | ICD-10-CM

## 2022-03-15 DIAGNOSIS — R39.14 BENIGN PROSTATIC HYPERPLASIA WITH INCOMPLETE BLADDER EMPTYING: ICD-10-CM

## 2022-03-15 DIAGNOSIS — E66.09 CLASS 1 OBESITY DUE TO EXCESS CALORIES WITH SERIOUS COMORBIDITY AND BODY MASS INDEX (BMI) OF 30.0 TO 30.9 IN ADULT: ICD-10-CM

## 2022-03-15 LAB — SL AMB POCT HEMOGLOBIN AIC: 6.2 (ref ?–6.5)

## 2022-03-15 PROCEDURE — 83036 HEMOGLOBIN GLYCOSYLATED A1C: CPT | Performed by: NURSE PRACTITIONER

## 2022-03-15 PROCEDURE — 3079F DIAST BP 80-89 MM HG: CPT | Performed by: NURSE PRACTITIONER

## 2022-03-15 PROCEDURE — 1036F TOBACCO NON-USER: CPT | Performed by: NURSE PRACTITIONER

## 2022-03-15 PROCEDURE — 3008F BODY MASS INDEX DOCD: CPT | Performed by: NURSE PRACTITIONER

## 2022-03-15 PROCEDURE — 99214 OFFICE O/P EST MOD 30 MIN: CPT | Performed by: NURSE PRACTITIONER

## 2022-03-15 NOTE — ASSESSMENT & PLAN NOTE
Following up with urology  Pt is considering prostatic stend vs scraping  Dr Mirna Marti, urology, following

## 2022-03-15 NOTE — ASSESSMENT & PLAN NOTE
History of gastric sleeve surgery  Encourage weight loss, nutrition and exercise  Carb and sugar moderation

## 2022-03-15 NOTE — PROGRESS NOTES
Assessment/Plan:    1  Prediabetes  Assessment & Plan:  Encourage nutrition and exercise as tolerated  Advise weight loss  Recheck 3 months  Orders:  -     POCT hemoglobin A1c    2  Benign prostatic hyperplasia with incomplete bladder emptying  Assessment & Plan:  Following up with urology  Pt is considering prostatic stend vs scraping  Dr Philippe Randolph, urology, following  3  Class 1 obesity due to excess calories with serious comorbidity and body mass index (BMI) of 30 0 to 30 9 in adult  Assessment & Plan:  History of gastric sleeve surgery  Encourage weight loss, nutrition and exercise  Carb and sugar moderation  BMI Counseling: Body mass index is 30 98 kg/m²  The BMI is above normal  Nutrition recommendations include moderation in carbohydrate intake and increasing intake of lean protein  Exercise recommendations include exercising 3-5 times per week  Rationale for BMI follow-up plan is due to patient being overweight or obese  Return in about 3 months (around 6/15/2022) for Recheck  Subjective:      Patient ID: Pierre Escalera is a 57 y o  male  Chief Complaint   Patient presents with    Diabetes     Pt here for a three month DM f/u  Len Hansen is a 57year old male who presents to the office for recheck of A1c  Reports he has been doing well overall  Reports that he continues to struggle with some aspects of nutrition and exercise  States he walks twice daily when the weather is permitting  Denies fever, chills, chest pain, shortness of breath, n/v/d  The following portions of the patient's history were reviewed and updated as appropriate: allergies, current medications, past family history, past medical history, past social history, past surgical history and problem list     Review of Systems   Constitutional: Negative for chills, fatigue and fever  Respiratory: Negative for cough, chest tightness and shortness of breath  Cardiovascular: Negative for chest pain  Current Outpatient Medications   Medication Sig Dispense Refill    amitriptyline (ELAVIL) 10 mg tablet       atorvastatin (LIPITOR) 10 mg tablet TAKE 1 TABLET DAILY 90 tablet 3    candesartan-hydrochlorothiazide (ATACAND HCT) 32-12 5 MG per tablet Take 1 tablet by mouth daily 90 tablet 3    Silodosin 8 MG CAPS        No current facility-administered medications for this visit  Objective:    /84 (BP Location: Left arm, Patient Position: Sitting, Cuff Size: Large)   Pulse 100   Temp (!) 97 2 °F (36 2 °C) (Temporal)   Resp 16   Ht 6' 0 25" (1 835 m)   Wt 104 kg (230 lb)   SpO2 97%   BMI 30 98 kg/m²        Physical Exam  Vitals reviewed  Constitutional:       Appearance: Normal appearance  HENT:      Head: Normocephalic and atraumatic  Cardiovascular:      Rate and Rhythm: Normal rate and regular rhythm  Pulses: Normal pulses  Heart sounds: Normal heart sounds  Pulmonary:      Effort: Pulmonary effort is normal       Breath sounds: Normal breath sounds  Neurological:      Mental Status: He is alert and oriented to person, place, and time     Psychiatric:         Mood and Affect: Mood normal                 ISABEL Wyatt

## 2022-05-29 LAB — SARS-COV-2 AG UPPER RESP QL IA: ABNORMAL

## 2022-05-31 ENCOUNTER — PATIENT MESSAGE (OUTPATIENT)
Dept: FAMILY MEDICINE CLINIC | Facility: CLINIC | Age: 62
End: 2022-05-31

## 2022-05-31 ENCOUNTER — TELEMEDICINE (OUTPATIENT)
Dept: FAMILY MEDICINE CLINIC | Facility: CLINIC | Age: 62
End: 2022-05-31
Payer: COMMERCIAL

## 2022-05-31 ENCOUNTER — TELEPHONE (OUTPATIENT)
Dept: FAMILY MEDICINE CLINIC | Facility: CLINIC | Age: 62
End: 2022-05-31

## 2022-05-31 VITALS — WEIGHT: 230 LBS | BODY MASS INDEX: 31.15 KG/M2 | HEIGHT: 72 IN

## 2022-05-31 DIAGNOSIS — U07.1 COVID-19: ICD-10-CM

## 2022-05-31 DIAGNOSIS — U07.1 COVID-19: Primary | ICD-10-CM

## 2022-05-31 PROCEDURE — 99213 OFFICE O/P EST LOW 20 MIN: CPT | Performed by: NURSE PRACTITIONER

## 2022-05-31 NOTE — TELEPHONE ENCOUNTER
From: Ellen Monroy  To: ISABEL Carrillo  Sent: 5/31/2022 9:04 AM EDT  Subject: Suspected COVID infection    I probably contracted COVID during a business trip to [facility], [facility] [address] last week  Photo of test, photo of vaccine card, and log my employer is asking me to keep attached

## 2022-05-31 NOTE — PROGRESS NOTES
COVID-19 Outpatient Progress Note    Assessment/Plan:    Problem List Items Addressed This Visit    None     Visit Diagnoses     COVID-19    -  Primary    Relevant Medications    nirmatrelvir & ritonavir (Paxlovid) tablet therapy pack         Disposition:     Patient has COVID-19 infection  Based off CDC guidelines, they were recommended to isolate for 5 days from the date of the positive test  If they remain asymptomatic, isolation may be ended followed by 5 days of wearing a mask when around othes to minimize risk of infecting others  If they have a fever, continue to stay home until fever resolves for at least 24 hours  I recommended continued isolation until at least 24 hours have passed since recovery defined as resolution of fever without the use of fever-reducing medications AND improvement in COVID symptoms AND 10 days have passed since onset of symptoms (or 10 days have passed since date of first positive viral diagnostic test for asymptomatic patients)  I have spent 15 minutes directly with the patient  Greater than 50% of this time was spent in counseling/coordination of care regarding: risks and benefits of treatment options, patient and family education and impressions  Encounter provider ISABEL Jett    Provider located at [address] [place] [address]  [id number]    Recent Visits  No visits were found meeting these conditions  Showing recent visits within past 7 days and meeting all other requirements  Today's Visits  Date Type Provider Dept   05/31/22 Telemedicine Elsy Perez, [address] [place] [address] Physicians   Showing today's visits and meeting all other requirements  Future Appointments  No visits were found meeting these conditions    Showing future appointments within next 150 days and meeting all other requirements     This virtual check-in was done via [employer] and patient was informed that this is a secure, HIPAA-compliant platform  He agrees to proceed  Patient agrees to participate in a virtual check in via telephone or video visit instead of presenting to the office to address urgent/immediate medical needs  Patient is aware this is a billable service  After connecting through [facility], the patient was identified by name and date of birth  Teodoro Jarrett was informed that this was a telemedicine visit and that the exam was being conducted confidentially over secure lines  My office door was closed  No one else was in the room  Teodoro Jarrett acknowledged consent and understanding of privacy and security of the telemedicine visit  I informed the patient that I have reviewed his record in Epic and presented the opportunity for him to ask any questions regarding the visit today  The patient agreed to participate  Verification of patient location:  Patient is located in the following state in which I hold an active license: [place]    Subjective:   Teodoro Jarrett is a 57 y o  male who has been screened for COVID-19  Symptom change since last report: unchanged  Patient's symptoms include fever, nasal congestion, rhinorrhea, sore throat, cough, diarrhea and myalgias  Patient denies chills, fatigue, malaise, anosmia, loss of taste, shortness of breath, chest tightness, abdominal pain, nausea, vomiting and headaches  - Date of symptom onset: 5/28/2022  - Date of positive COVID-19 test: 5/29/2022  Type of test: Home antigen  Home antigen result verified by provider  Will get picture of test uploaded/scanned into patient's chart  COVID-19 vaccination status: Fully vaccinated with booster    Ilia Myers has been staying home and has isolated themselves in his home  He is taking care to not share personal items and is cleaning all surfaces that are touched often, like counters, tabletops, and doorknobs using household cleaning sprays or wipes  He is wearing a mask when he leaves his room       Lab Results Component Value Date    [facility] Positive (Patient Reported) (A) 05/29/2022     Past Medical History:   Diagnosis Date    Cholelithiasis     Hematuria     Hypertension     Renal calculi 4/4/8908    Renal colic      Past Surgical History:   Procedure Laterality Date    ARTHROTOMY KNEE      with Medial Meniscectomy    COLECTOMY  2009    partial    GALLBLADDER SURGERY      LITHOTRIPSY  2000    Renal     Current Outpatient Medications   Medication Sig Dispense Refill    amitriptyline (ELAVIL) 10 mg tablet       atorvastatin (LIPITOR) 10 mg tablet TAKE 1 TABLET DAILY 90 tablet 3    candesartan-hydrochlorothiazide (ATACAND HCT) 32-12 5 MG per tablet Take 1 tablet by mouth daily 90 tablet 3    nirmatrelvir & ritonavir (Paxlovid) tablet therapy pack Take 3 tablets by mouth 2 (two) times a day for 5 days Take 2 nirmatrelvir tablets + 1 ritonavir tablet together per dose 30 tablet 0    Silodosin 8 MG CAPS        No current facility-administered medications for this visit  Allergies   Allergen Reactions    Nuts - Food Allergy Blisters     Annotation - [id number]: only walnuts       Review of Systems   Constitutional: Positive for fever  Negative for chills and fatigue  HENT: Positive for congestion, rhinorrhea and sore throat  Respiratory: Positive for cough  Negative for chest tightness and shortness of breath  Gastrointestinal: Positive for diarrhea  Negative for abdominal pain, nausea and vomiting  Musculoskeletal: Positive for myalgias  Neurological: Negative for headaches  Objective:    Vitals:    05/31/22 0943   Weight: 104 kg (230 lb)   Height: 6' 0 25" (1 835 m)       Physical Exam  Vitals reviewed  Constitutional:       Appearance: Normal appearance  HENT:      Head: Normocephalic and atraumatic  Neurological:      Mental Status: He is alert and oriented to person, place, and time     Psychiatric:         Mood and Affect: Mood normal          VIRTUAL VISIT DISCLAIMER    Nicola Malloy Jimmy Gaxiola verbally agrees to participate in [employer]  Pt is aware that [employer] could be limited without vital signs or the ability to perform a full hands-on physical exam  Sudeep Gaxiola understands he or the provider may request at any time to terminate the video visit and request the patient to seek care or treatment in person

## 2022-05-31 NOTE — TELEPHONE ENCOUNTER
Carmell Leventhal [employer] in [facility] states they don't have paxlovid Carmell Leventhal [facility] on their automated system, they do have it  Please call into [facility]

## 2022-06-01 ENCOUNTER — TELEPHONE (OUTPATIENT)
Dept: ADMINISTRATIVE | Facility: OTHER | Age: 62
End: 2022-06-01

## 2022-06-01 NOTE — TELEPHONE ENCOUNTER
[address]  06/01/22 9:33 AM     Hello, our patient attached above has had CRC: CT Colonography completed/performed  Please assist in updating the patient chart by making an External outreach to / Jareth Ta  [facility] facility located in [place], [place]  The date of service is 7/8/2021       Thank you,   Antonietta [facility] AT [address]

## 2022-06-01 NOTE — LETTER
Procedure Request Form: Colonoscopy      Date Requested: 22  Patient: Neoma Contras  Patient : 1960   Referring Provider: Xin Iain, MD        Date of Procedure ______________________________       The above patient has informed us that they have completed their   most recent Colonoscopy at your facility  Please complete   this form and attach all corresponding procedure reports/results  Comments __________________________________________________________  ____________________________________________________________________  ____________________________________________________________________  ____________________________________________________________________    Facility Completing Procedure _________________________________________    Form Completed By (print name) _______________________________________      Signature __________________________________________________________      These reports are needed for  compliance  Please fax this completed form and a copy of the procedure report to our office located at [place] [address] as soon as possible to [phone] cornelius Goddard Angry: Phone [phone]    We thank you for your assistance in treating our mutual patient     [facility] - [phone]

## 2022-06-01 NOTE — TELEPHONE ENCOUNTER
----- Message from Real Prophet sent at 6/1/2022  9:33 AM EDT -----  Regarding: care gap request - colonoscopy  06/01/22 9:33 AM    Hello, our patient attached above has had CRC: CT Colonography completed/performed  Please assist in updating the patient chart by making an External outreach to / [facility] facility located in [place], [place]  The date of service is 7/8/2021      Thank you,  Tizra  [address]

## 2022-06-01 NOTE — TELEPHONE ENCOUNTER
Upon review of the In Basket request and the patient's chart, initial outreach has been made via fax, please see Contacts section for details       Thank you  Collette Sinclair

## 2022-06-01 NOTE — LETTER
Procedure Request Form: CT Colonography      Date Requested: 22  Patient: Paris Ng  Patient : 1960   Referring Provider: Melissa Ybarra, MD        Date of Procedure ______________________________       The above patient has informed us that they have completed their   most recent CT Colonography at your facility  Please complete   this form and attach all corresponding procedure reports/results  Comments __________________________________________________________  ____________________________________________________________________  ____________________________________________________________________  ____________________________________________________________________    Facility Completing Procedure _________________________________________    Form Completed By (print name) _______________________________________      Signature __________________________________________________________      These reports are needed for  compliance  Please fax this completed form and a copy of the procedure report to our office located at [place] [address] as soon as possible to [phone] attention Jayshree Still: Phone [phone]    We thank you for your assistance in treating our mutual patient       [facility] - [phone]

## 2022-06-06 NOTE — TELEPHONE ENCOUNTER
As a follow-up, a second attempt has been made for outreach via fax, please see Contacts section for details      Thank you  Jason Sanchez

## 2022-06-07 NOTE — TELEPHONE ENCOUNTER
Upon review of the In Basket request we were able to locate, review, and update the patient chart as requested for CRC: Colonoscopy  Any additional questions or concerns should be emailed to the Practice Liaisons via [email or web address] email, please do not reply via In Basket      Thank you  Jaspal Teran

## 2022-06-20 ENCOUNTER — OFFICE VISIT (OUTPATIENT)
Dept: FAMILY MEDICINE CLINIC | Facility: CLINIC | Age: 62
End: 2022-06-20
Payer: COMMERCIAL

## 2022-06-20 VITALS
RESPIRATION RATE: 12 BRPM | TEMPERATURE: 96.4 F | DIASTOLIC BLOOD PRESSURE: 82 MMHG | WEIGHT: 226 LBS | HEIGHT: 72 IN | SYSTOLIC BLOOD PRESSURE: 118 MMHG | HEART RATE: 84 BPM | BODY MASS INDEX: 30.61 KG/M2 | OXYGEN SATURATION: 99 %

## 2022-06-20 DIAGNOSIS — E66.09 CLASS 1 OBESITY DUE TO EXCESS CALORIES WITH SERIOUS COMORBIDITY AND BODY MASS INDEX (BMI) OF 30.0 TO 30.9 IN ADULT: ICD-10-CM

## 2022-06-20 DIAGNOSIS — E78.49 FAMILIAL HYPERLIPIDEMIA: ICD-10-CM

## 2022-06-20 DIAGNOSIS — E11.9 TYPE 2 DIABETES MELLITUS WITHOUT COMPLICATION, WITHOUT LONG-TERM CURRENT USE OF INSULIN (HCC): ICD-10-CM

## 2022-06-20 DIAGNOSIS — N40.1 BENIGN PROSTATIC HYPERPLASIA WITH INCOMPLETE BLADDER EMPTYING: ICD-10-CM

## 2022-06-20 DIAGNOSIS — I10 PRIMARY HYPERTENSION: ICD-10-CM

## 2022-06-20 DIAGNOSIS — Z00.00 ENCOUNTER FOR ANNUAL GENERAL MEDICAL EXAMINATION WITHOUT ABNORMAL FINDINGS IN ADULT: Primary | ICD-10-CM

## 2022-06-20 DIAGNOSIS — R39.14 BENIGN PROSTATIC HYPERPLASIA WITH INCOMPLETE BLADDER EMPTYING: ICD-10-CM

## 2022-06-20 PROCEDURE — 3008F BODY MASS INDEX DOCD: CPT | Performed by: NURSE PRACTITIONER

## 2022-06-20 PROCEDURE — 3079F DIAST BP 80-89 MM HG: CPT | Performed by: NURSE PRACTITIONER

## 2022-06-20 PROCEDURE — 3074F SYST BP LT 130 MM HG: CPT | Performed by: NURSE PRACTITIONER

## 2022-06-20 PROCEDURE — 99396 PREV VISIT EST AGE 40-64: CPT | Performed by: NURSE PRACTITIONER

## 2022-06-20 PROCEDURE — 1036F TOBACCO NON-USER: CPT | Performed by: NURSE PRACTITIONER

## 2022-06-20 NOTE — ASSESSMENT & PLAN NOTE
Recheck A1c in 3 months  Encourage nutrition and weight loss  If A1c continues to elevate, consider metformin therapy       Lab Results   Component Value Date    HGBA1C 6 5 (H) 06/15/2022

## 2022-06-20 NOTE — PROGRESS NOTES
FAMILY [facility] HEALTH MAINTENANCE OFFICE VISIT  [facility] PHYSICIANS    NAME: Farrah Newton  AGE: 57 y o  SEX: male  : 1960     DATE: 2022    Assessment and Plan     1  Encounter for annual general medical examination without abnormal findings in adult  Comments:  Age appropriate screenings and recommendations discussed  Fasting labs reviewed  2  Benign prostatic hyperplasia with incomplete bladder emptying  Assessment & Plan:  Taking silodosin  Following up with Dr Aristeo Braun, urology  Stable  3  Primary hypertension  Assessment & Plan:  BP stable in office  Continue medications  4  Class 1 obesity due to excess calories with serious comorbidity and body mass index (BMI) of 30 0 to 30 9 in adult  Assessment & Plan:  Encourage nutrition and exercise  5  Familial hyperlipidemia  Assessment & Plan:  Reviewed lipid panel with patient  Stable  Continue medications as directed  6  Type 2 diabetes mellitus without complication, without long-term current use of insulin (HCC)  Assessment & Plan:  Recheck A1c in 3 months  Encourage nutrition and weight loss  If A1c continues to elevate, consider metformin therapy  Lab Results   Component Value Date    HGBA1C 6 5 (H) 06/15/2022           · Patient Counseling:   · Nutrition: Stressed importance of a well balanced diet, moderation of sodium/saturated fat, caloric balance and sufficient intake of fiber  · Exercise: Stressed the importance of regular exercise with a goal of 150 minutes per week  · Dental Health: Discussed daily flossing and brushing and regular dental visits     · Immunizations reviewed: Up To Date  · Discussed benefits of:  Colon Cancer Screening, Prostate Cancer Screening  and Screening labs   BMI Counseling: Body mass index is 30 44 kg/m²  Discussed with patient's BMI with him   The BMI is above normal  Nutrition recommendations include 3-5 servings of fruits/vegetables daily  Exercise recommendations include exercising 3-5 times per week  Return in about 3 months (around 9/20/2022) for Diabetic Follow Up  Chief Complaint     Chief Complaint   Patient presents with    Annual Exam       History of Present Illness     HPI    Well Adult Physical   Patient here for a comprehensive physical exam       Diet and Physical Activity  Diet: well balanced diet  Exercise: intermittently      Depression Screen  PHQ-2/9 Depression Screening    Little interest or pleasure in doing things: 0 - not at all  Feeling down, depressed, or hopeless: 0 - not at all  PHQ-2 Score: 0  PHQ-2 Interpretation: Negative depression screen          General Health  Hearing: Normal:  bilateral  Vision: goes for regular eye exams  Dental: regular dental visits    Reproductive Health  No issues       The following portions of the patient's history were reviewed and updated as appropriate: allergies, current medications, past family history, past medical history, past social history, past surgical history and problem list     Review of Systems     Review of Systems   Constitutional: Negative for diaphoresis, fatigue and fever  HENT: Negative for ear pain and hearing loss  Eyes: Negative for pain and visual disturbance  Respiratory: Negative for chest tightness and shortness of breath  Cardiovascular: Negative for chest pain, palpitations and leg swelling  Gastrointestinal: Negative for abdominal pain, constipation and diarrhea  Genitourinary: Negative for difficulty urinating  Musculoskeletal: Negative for arthralgias and myalgias  Skin: Negative for rash  Neurological: Negative for dizziness, numbness and headaches  Psychiatric/Behavioral: Negative for sleep disturbance         Past Medical History     Past Medical History:   Diagnosis Date    Cholelithiasis     Hematuria     Hypertension     Renal calculi 8/0/5824    Renal colic     Type 2 diabetes mellitus without complication, without long-term current use of insulin ([facility] [address] ) 6/20/2022       Past Surgical History     Past Surgical History:   Procedure Laterality Date    ARTHROTOMY KNEE      with Medial Meniscectomy    COLECTOMY  2009    partial    GALLBLADDER SURGERY      LITHOTRIPSY  2000    Renal       Social History     Social History     Socioeconomic History    Marital status: /Civil Union     Spouse name: None    Number of children: None    Years of education: None    Highest education level: None   Occupational History    None   Tobacco Use    Smoking status: Never Smoker    Smokeless tobacco: Never Used   Vaping Use    Vaping Use: Never used   Substance and Sexual Activity    Alcohol use:  Yes    Drug use: Never    Sexual activity: None   Other Topics Concern    None   Social History Narrative    Obesity in [employer], in pertinent negatives in Epic        Active advance directive    Dental care, regularly    No caffeine use     Social Determinants of Health     Financial Resource Strain: Not on file   Food Insecurity: Not on file   Transportation Needs: Not on file   Physical Activity: Not on file   Stress: Not on file   Social Connections: Not on file   Intimate Partner Violence: Not on file   Housing Stability: Not on file       Family History     Family History   Problem Relation Age of Onset    Hypertension Mother     Hyperlipidemia Mother     Hypothyroidism Mother     Skin cancer Father         forehead & Nose    Other Paternal Grandmother         Nervous Breakdown    Alcohol abuse Paternal Grandfather     Alcohol abuse Other     Alcohol abuse Family     Skin cancer Paternal Uncle         forehead & Nose    Substance Abuse Neg Hx     Mental illness Neg Hx        Current Medications       Current Outpatient Medications:     amitriptyline (ELAVIL) 10 mg tablet, , Disp: , Rfl:     atorvastatin (LIPITOR) 10 mg tablet, TAKE 1 TABLET DAILY, Disp: 90 tablet, Rfl: 0   candesartan-hydrochlorothiazide (ATACAND HCT) 32-12 5 MG per tablet, Take 1 tablet by mouth daily, Disp: 90 tablet, Rfl: 3    Silodosin 8 MG CAPS, , Disp: , Rfl:      Allergies     Allergies   Allergen Reactions    Nuts - Food Allergy Blisters     Annotation - [id number]: only walnuts       Objective     /82 (BP Location: Left arm, Patient Position: Sitting, Cuff Size: Adult)   Pulse 84   Temp (!) 96 4 °F (35 8 °C) (Temporal)   Resp 12   Ht 6' 0 25" (1 835 m)   Wt 103 kg (226 lb)   SpO2 99%   BMI 30 44 kg/m²      Physical Exam  Constitutional:       General: He is not in acute distress  Appearance: Normal appearance  He is well-developed  He is not diaphoretic  HENT:      Head: Normocephalic and atraumatic  Right Ear: Tympanic membrane, ear canal and external ear normal  There is impacted cerumen  Left Ear: Tympanic membrane, ear canal and external ear normal  There is impacted cerumen  Eyes:      General: Lids are normal       Extraocular Movements: Extraocular movements intact  Conjunctiva/sclera: Conjunctivae normal       Pupils: Pupils are equal, round, and reactive to light  Pupils are equal       Funduscopic exam:     Right eye: No hemorrhage or exudate  Red reflex present  Left eye: No hemorrhage or exudate  Red reflex present  Neck:      Thyroid: No thyroid mass or thyromegaly  Vascular: No carotid bruit  Cardiovascular:      Rate and Rhythm: Normal rate and regular rhythm  Pulses: Normal pulses  Heart sounds: Normal heart sounds, S1 normal and S2 normal  No murmur heard  Pulmonary:      Effort: Pulmonary effort is normal       Breath sounds: Normal breath sounds  No decreased breath sounds, wheezing, rhonchi or rales  Chest:   Breasts:      Right: No supraclavicular adenopathy  Left: No supraclavicular adenopathy  Abdominal:      General: Bowel sounds are normal  There is no distension  Palpations: Abdomen is soft   There is no hepatomegaly or splenomegaly  Tenderness: There is no abdominal tenderness  Musculoskeletal:         General: No tenderness or deformity  Normal range of motion  Cervical back: Full passive range of motion without pain, normal range of motion and neck supple  Right lower leg: No edema  Left lower leg: No edema  Lymphadenopathy:      Cervical: No cervical adenopathy  Upper Body:      Right upper body: No supraclavicular adenopathy  Left upper body: No supraclavicular adenopathy  Skin:     General: Skin is warm and dry  Findings: No rash  Neurological:      Mental Status: He is alert and oriented to person, place, and time  Cranial Nerves: No cranial nerve deficit  Coordination: Coordination normal       Deep Tendon Reflexes: Reflexes are normal and symmetric  Psychiatric:         Speech: Speech normal          Behavior: Behavior normal          Thought Content:  Thought content normal          Judgment: Judgment normal              Obi Botello, [address] [place] [address],[address]

## 2022-08-10 DIAGNOSIS — E78.49 FAMILIAL HYPERLIPIDEMIA: ICD-10-CM

## 2022-08-10 DIAGNOSIS — E11.9 TYPE 2 DIABETES MELLITUS WITHOUT COMPLICATION, WITHOUT LONG-TERM CURRENT USE OF INSULIN (HCC): Primary | ICD-10-CM

## 2022-08-23 DIAGNOSIS — E78.49 FAMILIAL HYPERLIPIDEMIA: ICD-10-CM

## 2022-08-23 RX ORDER — ATORVASTATIN CALCIUM 10 MG/1
TABLET, FILM COATED ORAL
Qty: 90 TABLET | Refills: 0 | Status: SHIPPED | OUTPATIENT
Start: 2022-08-23 | End: 2022-09-29

## 2022-09-29 DIAGNOSIS — E78.49 FAMILIAL HYPERLIPIDEMIA: ICD-10-CM

## 2022-09-29 RX ORDER — ATORVASTATIN CALCIUM 10 MG/1
TABLET, FILM COATED ORAL
Qty: 90 TABLET | Refills: 0 | Status: SHIPPED | OUTPATIENT
Start: 2022-09-29

## 2022-11-08 LAB
LEFT EYE DIABETIC RETINOPATHY: POSITIVE
RIGHT EYE DIABETIC RETINOPATHY: POSITIVE

## 2023-02-01 ENCOUNTER — HOSPITAL ENCOUNTER (OUTPATIENT)
Dept: RADIOLOGY | Facility: HOSPITAL | Age: 63
Discharge: HOME | End: 2023-02-01

## 2023-03-13 ENCOUNTER — TELEPHONE (OUTPATIENT)
Dept: FAMILY MEDICINE CLINIC | Facility: CLINIC | Age: 63
End: 2023-03-13

## 2023-03-13 NOTE — TELEPHONE ENCOUNTER
Wanted to let you know that he tested positive for covid this morning  He stated that he did not need anything  He has had covid before and has had paxlovid    Both times it felt the same

## 2023-06-06 DIAGNOSIS — Z12.5 SCREENING FOR PROSTATE CANCER: ICD-10-CM

## 2023-06-06 DIAGNOSIS — Z13.29 SCREENING FOR THYROID DISORDER: ICD-10-CM

## 2023-06-06 DIAGNOSIS — E78.49 FAMILIAL HYPERLIPIDEMIA: ICD-10-CM

## 2023-06-06 DIAGNOSIS — I10 ESSENTIAL HYPERTENSION: ICD-10-CM

## 2023-06-06 DIAGNOSIS — E11.9 TYPE 2 DIABETES MELLITUS WITHOUT COMPLICATION, WITHOUT LONG-TERM CURRENT USE OF INSULIN (HCC): Primary | ICD-10-CM

## 2023-06-06 DIAGNOSIS — Z00.00 ENCOUNTER FOR ANNUAL GENERAL MEDICAL EXAMINATION WITHOUT ABNORMAL FINDINGS IN ADULT: ICD-10-CM

## 2023-06-19 LAB
BASOPHILS # BLD AUTO: 0.1 X10E3/UL (ref 0–0.2)
BASOPHILS NFR BLD AUTO: 1 %
EOSINOPHIL # BLD AUTO: 0.3 X10E3/UL (ref 0–0.4)
EOSINOPHIL NFR BLD AUTO: 5 %
ERYTHROCYTE [DISTWIDTH] IN BLOOD BY AUTOMATED COUNT: 12.9 % (ref 11.6–15.4)
HCT VFR BLD AUTO: 49.1 % (ref 37.5–51)
HGB BLD-MCNC: 17.1 G/DL (ref 13–17.7)
IMM GRANULOCYTES # BLD: 0 X10E3/UL (ref 0–0.1)
IMM GRANULOCYTES NFR BLD: 0 %
LYMPHOCYTES # BLD AUTO: 1.6 X10E3/UL (ref 0.7–3.1)
LYMPHOCYTES NFR BLD AUTO: 31 %
MCH RBC QN AUTO: 31.1 PG (ref 26.6–33)
MCHC RBC AUTO-ENTMCNC: 34.8 G/DL (ref 31.5–35.7)
MCV RBC AUTO: 89 FL (ref 79–97)
MONOCYTES # BLD AUTO: 0.6 X10E3/UL (ref 0.1–0.9)
MONOCYTES NFR BLD AUTO: 12 %
NEUTROPHILS # BLD AUTO: 2.6 X10E3/UL (ref 1.4–7)
NEUTROPHILS NFR BLD AUTO: 51 %
PLATELET # BLD AUTO: 213 X10E3/UL (ref 150–450)
RBC # BLD AUTO: 5.49 X10E6/UL (ref 4.14–5.8)
WBC # BLD AUTO: 5.1 X10E3/UL (ref 3.4–10.8)

## 2023-06-20 LAB
ALBUMIN SERPL-MCNC: 4.9 G/DL (ref 3.8–4.8)
ALBUMIN/CREAT UR: 12 MG/G CREAT (ref 0–29)
ALBUMIN/GLOB SERPL: 1.9 {RATIO} (ref 1.2–2.2)
ALP SERPL-CCNC: 73 IU/L (ref 44–121)
ALT SERPL-CCNC: 21 IU/L (ref 0–44)
AST SERPL-CCNC: 19 IU/L (ref 0–40)
BILIRUB SERPL-MCNC: 1.7 MG/DL (ref 0–1.2)
BUN SERPL-MCNC: 13 MG/DL (ref 8–27)
BUN/CREAT SERPL: 12 (ref 10–24)
CALCIUM SERPL-MCNC: 9.8 MG/DL (ref 8.6–10.2)
CHLORIDE SERPL-SCNC: 99 MMOL/L (ref 96–106)
CHOLEST SERPL-MCNC: 180 MG/DL (ref 100–199)
CHOLEST/HDLC SERPL: 4.1 RATIO (ref 0–5)
CO2 SERPL-SCNC: 25 MMOL/L (ref 20–29)
CREAT SERPL-MCNC: 1.05 MG/DL (ref 0.76–1.27)
CREAT UR-MCNC: 111 MG/DL
EGFR: 80 ML/MIN/1.73
EST. AVERAGE GLUCOSE BLD GHB EST-MCNC: 134 MG/DL
GLOBULIN SER-MCNC: 2.6 G/DL (ref 1.5–4.5)
GLUCOSE SERPL-MCNC: 102 MG/DL (ref 70–99)
HBA1C MFR BLD: 6.3 % (ref 4.8–5.6)
HDLC SERPL-MCNC: 44 MG/DL
LDLC SERPL CALC-MCNC: 115 MG/DL (ref 0–99)
MICROALBUMIN UR-MCNC: 13.7 UG/ML
POTASSIUM SERPL-SCNC: 4.7 MMOL/L (ref 3.5–5.2)
PROT SERPL-MCNC: 7.5 G/DL (ref 6–8.5)
PSA SERPL-MCNC: 1.9 NG/ML (ref 0–4)
SL AMB REFLEX CRITERIA: NORMAL
SL AMB VLDL CHOLESTEROL CALC: 21 MG/DL (ref 5–40)
SODIUM SERPL-SCNC: 138 MMOL/L (ref 134–144)
TRIGL SERPL-MCNC: 117 MG/DL (ref 0–149)
TSH SERPL DL<=0.005 MIU/L-ACNC: 0.88 UIU/ML (ref 0.45–4.5)

## 2023-07-31 ENCOUNTER — TELEPHONE (OUTPATIENT)
Dept: FAMILY MEDICINE CLINIC | Facility: CLINIC | Age: 63
End: 2023-07-31

## 2023-07-31 NOTE — TELEPHONE ENCOUNTER
Aaron Knox was scheduled in June for his CPX, but we cancelled on him due to passing of your dad    He had his bw in June, he is rescheduled for 9/8 and has new insurance. Does he need new bw done. If yes, please place order.  Lab kenan

## 2023-09-08 DIAGNOSIS — E78.49 FAMILIAL HYPERLIPIDEMIA: ICD-10-CM

## 2023-09-11 RX ORDER — ATORVASTATIN CALCIUM 10 MG/1
TABLET, FILM COATED ORAL
Qty: 30 TABLET | Refills: 0 | Status: SHIPPED | OUTPATIENT
Start: 2023-09-11

## 2023-09-30 DIAGNOSIS — E78.49 FAMILIAL HYPERLIPIDEMIA: ICD-10-CM

## 2023-10-02 RX ORDER — ATORVASTATIN CALCIUM 10 MG/1
TABLET, FILM COATED ORAL
Qty: 30 TABLET | Refills: 0 | Status: SHIPPED | OUTPATIENT
Start: 2023-10-02

## 2023-10-06 ENCOUNTER — TELEPHONE (OUTPATIENT)
Dept: ADMINISTRATIVE | Facility: OTHER | Age: 63
End: 2023-10-06

## 2023-10-06 ENCOUNTER — OFFICE VISIT (OUTPATIENT)
Dept: FAMILY MEDICINE CLINIC | Facility: CLINIC | Age: 63
End: 2023-10-06
Payer: COMMERCIAL

## 2023-10-06 VITALS
OXYGEN SATURATION: 98 % | RESPIRATION RATE: 20 BRPM | WEIGHT: 224 LBS | SYSTOLIC BLOOD PRESSURE: 140 MMHG | HEART RATE: 80 BPM | TEMPERATURE: 97.3 F | BODY MASS INDEX: 29.69 KG/M2 | HEIGHT: 73 IN | DIASTOLIC BLOOD PRESSURE: 90 MMHG

## 2023-10-06 DIAGNOSIS — E11.9 TYPE 2 DIABETES MELLITUS WITHOUT COMPLICATION, WITHOUT LONG-TERM CURRENT USE OF INSULIN (HCC): ICD-10-CM

## 2023-10-06 DIAGNOSIS — Z00.00 ENCOUNTER FOR ANNUAL GENERAL MEDICAL EXAMINATION WITHOUT ABNORMAL FINDINGS IN ADULT: Primary | ICD-10-CM

## 2023-10-06 DIAGNOSIS — Z23 NEED FOR INFLUENZA VACCINATION: ICD-10-CM

## 2023-10-06 DIAGNOSIS — Z23 NEED FOR PNEUMOCOCCAL 20-VALENT CONJUGATE VACCINATION: ICD-10-CM

## 2023-10-06 DIAGNOSIS — E78.49 FAMILIAL HYPERLIPIDEMIA: ICD-10-CM

## 2023-10-06 DIAGNOSIS — H90.6 MIXED CONDUCTIVE AND SENSORINEURAL HEARING LOSS OF BOTH EARS: ICD-10-CM

## 2023-10-06 DIAGNOSIS — I10 PRIMARY HYPERTENSION: ICD-10-CM

## 2023-10-06 PROBLEM — R73.09 ELEVATED GLUCOSE: Status: RESOLVED | Noted: 2020-06-15 | Resolved: 2023-10-06

## 2023-10-06 PROBLEM — R73.03 PREDIABETES: Status: RESOLVED | Noted: 2021-06-16 | Resolved: 2023-10-06

## 2023-10-06 PROCEDURE — 90472 IMMUNIZATION ADMIN EACH ADD: CPT

## 2023-10-06 PROCEDURE — 90686 IIV4 VACC NO PRSV 0.5 ML IM: CPT

## 2023-10-06 PROCEDURE — 90471 IMMUNIZATION ADMIN: CPT

## 2023-10-06 PROCEDURE — 99396 PREV VISIT EST AGE 40-64: CPT | Performed by: NURSE PRACTITIONER

## 2023-10-06 PROCEDURE — 90677 PCV20 VACCINE IM: CPT

## 2023-10-06 NOTE — PROGRESS NOTES
[facility] HEALTH MAINTENANCE OFFICE VISIT  [facility] -  [address] [facility] PHYSICIANS    NAME: Philip Pollack  AGE: 58 y.o. SEX: male  : 1960     DATE: 10/6/2023    Assessment and Plan     1. Encounter for annual general medical examination without abnormal findings in adult  Comments:  Age appropriate screenings and recommendations discussed. Fasting labs reviewed. 2. Type 2 diabetes mellitus without complication, without long-term current use of insulin ([address])  Assessment & Plan:  Pt doing ok overall. Recheck 3 months     Lab Results   Component Value Date    HGBA1C 6.3 (H) 2023       Orders:  -     POCT hemoglobin A1c  -     Pneumococcal Conjugate Vaccine 20-valent (Pcv20)  -     Hemoglobin A1C    3. Primary hypertension  Assessment & Plan:  Continue medications as directed. Stable. 4. Familial hyperlipidemia  Assessment & Plan:  Pt is taking atorvastatin 10 mg nightly. Stable, no issues. 5. Need for influenza vaccination  -     influenza vaccine, quadrivalent, 0.5 mL, preservative-free, for adult and pediatric patients 6 mos+ (AFLURIA, FLUARIX, FLULAVAL, FLUZONE)    6. Need for pneumococcal 20-valent conjugate vaccination  -     Pneumococcal Conjugate Vaccine 20-valent (Pcv20)    7. Mixed conductive and sensorineural hearing loss of both ears  -     Ambulatory Referral to Audiology;  Future      · Patient Counseling:   · Nutrition: Stressed importance of a well balanced diet, moderation of sodium/saturated fat, caloric balance and sufficient intake of fiber  · Exercise: Stressed the importance of regular exercise with a goal of 150 minutes per week  · Dental Health: Discussed daily flossing and brushing and regular dental visits   · Alcohol Use:  Recommended moderation of alcohol intake  · Injury Prevention: Discussed Safety Belts, Safety Helmets, and Smoke Detectors    · Immunizations reviewed: See Orders and Risks and Benefits discussed  · Discussed benefits of:  Colon Cancer Screening, Prostate Cancer Screening  and Screening labs. • BMI Counseling: Body mass index is 29.96 kg/m². Discussed with patient's BMI with him. The BMI is above normal. Exercise recommendations include exercising 3-5 times per week. Return in about 3 months (around 1/6/2024) for Diabetic Follow Up. Chief Complaint     Chief Complaint   Patient presents with   • Physical Exam       History of Present Illness     HPI    Well Adult Physical   Patient here for a comprehensive physical exam.      Diet and Physical Activity  Diet: well balanced diet  Exercise: intermittently      Depression Screen  PHQ-2/9 Depression Screening    Little interest or pleasure in doing things: 0 - not at all  Feeling down, depressed, or hopeless: 0 - not at all  PHQ-2 Score: 0  PHQ-2 Interpretation: Negative depression screen          General Health  Hearing: Normal:  bilateral  Vision: goes for regular eye exams and wears glasses  Dental: regular dental visits    Reproductive Health  No issues       The following portions of the patient's history were reviewed and updated as appropriate: allergies, current medications, past family history, past medical history, past social history, past surgical history and problem list.    Review of Systems     Review of Systems   Constitutional: Negative for diaphoresis, fatigue and fever. HENT: Negative for ear pain and hearing loss. Eyes: Negative for pain and visual disturbance. Respiratory: Negative for chest tightness and shortness of breath. Cardiovascular: Negative for chest pain, palpitations and leg swelling. Gastrointestinal: Negative for abdominal pain, constipation and diarrhea. Genitourinary: Negative for difficulty urinating. Musculoskeletal: Negative for arthralgias and myalgias. Skin: Negative for rash. Neurological: Negative for dizziness, numbness and headaches. Psychiatric/Behavioral: Negative for sleep disturbance. Past Medical History     Past Medical History:   Diagnosis Date   • Cholelithiasis    • Hematuria    • Hypertension    • Renal calculi 7/5/8905   • Renal colic    • Type 2 diabetes mellitus without complication, without long-term current use of insulin ([address]) 6/20/2022       Past Surgical History     Past Surgical History:   Procedure Laterality Date   • ARTHROTOMY KNEE      with Medial Meniscectomy   • COLECTOMY  2009    partial   • GALLBLADDER SURGERY     • LITHOTRIPSY  2000    Renal       Social History     Social History     Socioeconomic History   • Marital status: /Civil Union     Spouse name: None   • Number of children: None   • Years of education: None   • Highest education level: None   Occupational History   • None   Tobacco Use   • Smoking status: Never   • Smokeless tobacco: Never   Vaping Use   • Vaping Use: Never used   Substance and Sexual Activity   • Alcohol use:  Yes   • Drug use: Never   • Sexual activity: None   Other Topics Concern   • None   Social History Narrative    Obesity in [employer], in pertinent negatives in Epic        Active advance directive    Dental care, regularly    No caffeine use     Social Determinants of Health     Financial Resource Strain: Not on file   Food Insecurity: Not on file   Transportation Needs: Not on file   Physical Activity: Not on file   Stress: Not on file   Social Connections: Not on file   Intimate Partner Violence: Not on file   Housing Stability: Not on file       Family History     Family History   Problem Relation Age of Onset   • Hypertension Mother    • Hyperlipidemia Mother    • Hypothyroidism Mother    • Skin cancer Father         forehead & Nose   • Other Paternal Grandmother         Nervous Breakdown   • Alcohol abuse Paternal Grandfather    • Alcohol abuse Other    • Alcohol abuse Family    • Skin cancer Paternal Uncle         forehead & Nose   • Substance Abuse Neg Hx    • Mental illness Neg Hx        Current Medications Current Outpatient Medications:   •  amitriptyline (ELAVIL) 10 mg tablet, , Disp: , Rfl:   •  atorvastatin (LIPITOR) 10 mg tablet, TAKE 1 TABLET DAILY, Disp: 30 tablet, Rfl: 0  •  candesartan-hydrochlorothiazide (ATACAND HCT) 32-12.5 MG per tablet, Take 1 tablet by mouth daily, Disp: 90 tablet, Rfl: 3  •  Silodosin 8 MG CAPS, , Disp: , Rfl:      Allergies     Allergies   Allergen Reactions   • Nuts - Food Allergy Blisters     [facility] [id number]: only walnuts       Objective     /90 (BP Location: Right arm, Patient Position: Sitting, Cuff Size: Large)   Pulse 80   Temp (!) 97.3 °F (36.3 °C) (Temporal)   Resp 20   Ht 6' 0.5" (1.842 m)   Wt 102 kg (224 lb)   SpO2 98%   BMI 29.96 kg/m²      Physical Exam  Vitals reviewed. Constitutional:       General: He is not in acute distress. Appearance: Normal appearance. He is well-developed. He is not diaphoretic. HENT:      Head: Normocephalic and atraumatic. Right Ear: Tympanic membrane, ear canal and external ear normal.      Left Ear: Tympanic membrane, ear canal and external ear normal.   Eyes:      General: Lids are normal.      Extraocular Movements: Extraocular movements intact. Conjunctiva/sclera: Conjunctivae normal.      Pupils: Pupils are equal, round, and reactive to light. Pupils are equal.      Funduscopic exam:     Right eye: Red reflex present. Left eye: Red reflex present. Neck:      Thyroid: No thyroid mass or thyromegaly. Vascular: No carotid bruit. Cardiovascular:      Rate and Rhythm: Normal rate and regular rhythm. Pulses: Normal pulses. no weak pulses          Posterior tibial pulses are 2+ on the right side and 2+ on the left side. Heart sounds: Normal heart sounds, S1 normal and S2 normal. No murmur heard. Pulmonary:      Effort: Pulmonary effort is normal.      Breath sounds: Normal breath sounds. No decreased breath sounds, wheezing, rhonchi or rales.    Abdominal:      General: Bowel sounds are normal. There is no distension. Palpations: Abdomen is soft. There is no hepatomegaly or splenomegaly. Tenderness: There is no abdominal tenderness. Musculoskeletal:         General: No tenderness or deformity. Normal range of motion. Cervical back: Full passive range of motion without pain, normal range of motion and neck supple. Right lower leg: No edema. Left lower leg: No edema. Feet:      Right foot:      Skin integrity: No ulcer, skin breakdown, erythema, warmth, callus or dry skin. Left foot:      Skin integrity: No ulcer, skin breakdown, erythema, warmth, callus or dry skin. Lymphadenopathy:      Cervical: No cervical adenopathy. Upper Body:      Right upper body: No supraclavicular adenopathy. Left upper body: No supraclavicular adenopathy. Skin:     General: Skin is warm and dry. Findings: No rash. Neurological:      General: No focal deficit present. Mental Status: He is alert and oriented to person, place, and time. Cranial Nerves: No cranial nerve deficit. Motor: Motor function is intact. Coordination: Coordination normal.      Deep Tendon Reflexes: Reflexes are normal and symmetric. Psychiatric:         Speech: Speech normal.         Behavior: Behavior normal.         Thought Content: Thought content normal.         Judgment: Judgment normal.              Patient's shoes and socks removed. Right Foot/Ankle   Right Foot Inspection  Skin Exam: skin normal and skin intact. No dry skin, no warmth, no callus, no erythema, no maceration, no abnormal color, no pre-ulcer, no ulcer and no callus. Toe Exam: ROM and strength within normal limits. Sensory   Vibration: intact  Proprioception: intact  Monofilament testing: intact    Vascular  Capillary refills: < 3 seconds  The right PT pulse is 2+. Left Foot/Ankle  Left Foot Inspection  Skin Exam: skin normal and skin intact.  No dry skin, no warmth, no erythema, no maceration, normal color, no pre-ulcer, no ulcer and no callus. Toe Exam: ROM and strength within normal limits. Sensory   Vibration: intact  Proprioception: intact  Monofilament testing: intact    Vascular  Capillary refills: < 3 seconds  The left PT pulse is 2+.      Assign Risk Category  No deformity present  No loss of protective sensation  No weak pulses  Risk: 0        Connor Miranda, [id number] [employer] wound check

## 2023-10-06 NOTE — TELEPHONE ENCOUNTER
----- Message from Therese Humphrey sent at 10/6/2023 11:11 AM EDT -----  Regarding: CARE GAP REQUST - DM EYE  10/06/23 11:11 AM    Hello, our patient attached above has had Diabetic Eye Exam completed/performed. Please assist in updating the patient chart by making an External outreach to Dr Billye Simmonds facility located in [place], [place]. The date of service is 2023.     Thank you,  Therese Humphrey, [address] [facility]

## 2023-10-09 ENCOUNTER — TELEPHONE (OUTPATIENT)
Dept: ADMINISTRATIVE | Facility: OTHER | Age: 63
End: 2023-10-09

## 2023-10-09 NOTE — LETTER
Diabetic Eye Exam Form    Date Requested: 10/09/23  Patient: Karissa Jules  Patient : 1960   Referring Provider: ISABEL Norman      DIABETIC Eye Exam Date _______________________________      Type of Exam MUST be documented for Diabetic Eye Exams. Please CHECK ONE. Retinal Exam       Dilated Retinal Exam       OCT       Optomap-Iris Exam      Fundus Photography       Left Eye - Please check Retinopathy or No Retinopathy        Exam did show retinopathy    Exam did not show retinopathy       Right Eye - Please check Retinopathy or No Retinopathy       Exam did show retinopathy    Exam did not show retinopathy       Comments __________________________________________________________    Practice Providing Exam ______________________________________________    Exam Performed By (print name) _______________________________________      Provider Signature ___________________________________________________      These reports are needed for  compliance. Please fax this completed form and a copy of the Diabetic Eye Exam report to our office located at [address] [place] [address] as soon as possible via Fax [phone] cornelius Looney: Phone [phone]  We thank you for your assistance in treating our mutual patient.

## 2023-10-09 NOTE — TELEPHONE ENCOUNTER
----- Message from Jdae Elizabeth sent at 10/6/2023 12:09 PM EDT -----  Regarding: Diabetic Eye Exam - [facility]  10/06/23 12:09 PM    Hello, our patient attached above has had Diabetic Eye Exam completed/performed. Please assist in updating the patient chart by making an External outreach to Dr. Dickey Night facility located in [address]. The date of service is (most recent visit).     Thank you,  [facility]

## 2023-10-09 NOTE — TELEPHONE ENCOUNTER
Upon review of the In Basket request and the patient's chart, initial outreach has been made via fax to facility. Please see Contacts section for details.      Thank you  Alla Dominguez

## 2023-10-10 NOTE — TELEPHONE ENCOUNTER
Upon review of the In Basket request we were able to locate, review, and update the patient chart as requested for Diabetic Eye Exam.    Any additional questions or concerns should be emailed to the Practice Liaisons via the appropriate education email address, please do not reply via In Basket.     Thank you  Bertha West

## 2023-10-16 NOTE — TELEPHONE ENCOUNTER
Upon review of the In Basket request ;  We can't complete your request; The message was sent to the office to request more information, the staff never responded with required information. Any additional questions or concerns should be emailed to the Practice Liaisons via the appropriate education email address, please do not reply via In Basket.     Thank you  Rony Cornelius MA

## 2023-10-23 DIAGNOSIS — E78.49 FAMILIAL HYPERLIPIDEMIA: ICD-10-CM

## 2023-10-23 RX ORDER — ATORVASTATIN CALCIUM 10 MG/1
TABLET, FILM COATED ORAL
Qty: 30 TABLET | Refills: 0 | Status: SHIPPED | OUTPATIENT
Start: 2023-10-23

## 2023-10-24 ENCOUNTER — OFFICE VISIT (OUTPATIENT)
Dept: AUDIOLOGY | Facility: CLINIC | Age: 63
End: 2023-10-24
Payer: COMMERCIAL

## 2023-10-24 DIAGNOSIS — H90.3 SENSORY HEARING LOSS, BILATERAL: Primary | ICD-10-CM

## 2023-10-24 DIAGNOSIS — H90.6 MIXED CONDUCTIVE AND SENSORINEURAL HEARING LOSS OF BOTH EARS: ICD-10-CM

## 2023-10-24 PROCEDURE — 92557 COMPREHENSIVE HEARING TEST: CPT | Performed by: AUDIOLOGIST

## 2023-10-24 PROCEDURE — 92567 TYMPANOMETRY: CPT | Performed by: AUDIOLOGIST

## 2023-10-24 NOTE — PROGRESS NOTES
ADULT HEARING EVALUATION - [address] AUDIOLOGY      Patient Name: Codi Madrid   MRN:  [id number]   :  1960   Age: 58 y.o. Gender: male   DOS: 10/24/2023     HISTORY:     Codi Madrid, a 58 y.o. male, was seen on 10/24/2023 at the referral of Dr. Isabella Noland for an audiometric evaluation. Mr. GASPAR [facility] was unaccompanied to today's visit. Mr. GASPAR [facility] is a new patient to our practice. Today, Mr. Schmidt's primary complaint(s) was tinnitus and difficulty hearing especially in noisy environments. Mr Schmidt's wife has recently expressed that she thinks he is having difficulty hearing her. Onset of tinnitus reportedly began in childhood but has gotten significantly worse in the last 8 years. [facility] reported high pitch "whistles' and humming tinnitus in both ears. He uses fan noise to mask the tinnitus for sleeping and has always preferred using fans for help sleeping even as a child. Mr. GASPAR [facility] denied otalgia, otorrhea, aural fullness, and dizziness. History of otitis was negative. [facility] has no history of ear surgeries. Family history of hearing loss was positive, with his father ( in 2018) and brother having been diagnosed with acquired hearing loss over time. His father wore hearing aids before his death and his brother has been wearing hearing aids for about 4 years. Communication difficulties include not hearing well in groups or in noisy environments . 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 Other documented medical history states that [facility]  has a past medical history of Cholelithiasis, Hematuria, Hypertension, Renal calculi (9650), Renal colic, and Type 2 diabetes mellitus without complication, without long-term current use of insulin ([address]) (2022). [facility] has not had his hearing tested previously.      RESULTS:    Otoscopic Evaluation:   Right Ear: Non-occluding cerumen, TM view obscured   Left Ear: Unremarkable, canal clear    Tympanometry:   Right Ear: Type A; normal middle ear pressure and static compliance    Left Ear: Type A; normal middle ear pressure and static compliance     Audiometry:  Conventional pure tone audiometry from 250 - 8000 Hz  obtained with good reliability and revealed the following:     Right Ear: normal from 250-1500 Hz sloping to moderately-severe sensorineural hearing loss (SNHL)   Left Ear: normal from 250-1500 Hz to moderately-severe sensorineural hearing loss (SNHL)     Speech Audiometry:    Speech Reception (SRT)   Right Ear: 15 dB HL   Left Ear: 15 dB HL    Word Recognition Scores (WRS):  Right Ear: good (84 % correct)     Left Ear: good (88 % correct)   Stimuli: [id number]    IMPRESSIONS:  Mr. Cardona has a bilateral sensorineural hearing loss that is asymmetric at 4000 Hz and 8000 Hz. HE is an excellent candidate for binaural hearing aids. The results of today's findings were reviewed with Mr. Cardona and his hearing thresholds were explained at length. Treatment options, including amplification and communication strategies, were discussed as appropriate. Mr. Cardona voiced understanding of his test results and had no further questions. RECOMMENDATIONS:    1.) Follow-up with referring provider. 2.) ENT eval for cerumen management of right ear and asymmetry of the bilateral sensorineural hearing loss. 3. HAE with Trinity Guo for trial with hearing aids. *see attached audiogram*    It was a pleasure working with Mr. Cardona today. Thank you for referring this patient.      Trinity Wilkins., CCC-A  Clinical Audiologist    [address] [address]

## 2023-11-06 ENCOUNTER — OFFICE VISIT (OUTPATIENT)
Dept: AUDIOLOGY | Facility: CLINIC | Age: 63
End: 2023-11-06

## 2023-11-06 DIAGNOSIS — H90.3 SENSORY HEARING LOSS, BILATERAL: Primary | ICD-10-CM

## 2023-11-06 NOTE — PROGRESS NOTES
HEARING AID EVALUTION [facility] AUDIOLOGY    Patient Name: Franc Medina   MRN:  [id number]   :  1960   Age: 58 y.o. Gender: male   DOS: 2023     Franc Medina was seen today for a hearing aid evaluation following his audiometric testing performed on 10/24/2023.  [facility] was unaccompanied to today's visit.  [facility] was referred by Dr. Seven Gilbert. The audiometric evaluation on 10/24/2023 revealed mild to moderately-severe sensorineural hearing loss (SNHL) in the right ear and normal to severe sensorineural hearing loss (SNHL) in the left ear. Word recognition scores were good in the right ear and good in the left ear. This findings were reviewed with  [facility]. All of his questions regarding his hearing status were addressed, and the importance of realistic expectations of hearing loss and amplification were emphasized. Hearing aids are assistive devices and are not designed to restore normal hearing. The difference between peripheral hearing loss and speech understanding (central hearing loss) was explained. While improvement with amplification is possible, there will always be word understanding problems due to the inherent nature of hearing loss. These problems will be greater in background noise than in quiet situations.  [facility] was counseled on the importance of self-advocacy, motivation, as well as effective communication strategies that can be used to optimize hearing aid success. These effective communication strategies include:   1.) Maintaining face-to-face communication, allowing for speechreading of facial expressions, lips, and gestures. 2.) Reducing background noise and distance between communication partners. 3.) Having communication partners reduce their rate of speech when appropriate. 4.) Beginning conversation by getting communication partner's attention and stating the topic, allowing for context clues to help fill in gaps in comprehension.     5.) Asking for rephrasing of aspects of conversation that are missed when needed rather than asking for repetitions. To better determine which communication difficulties they are looking to improve upon, a client-oriented scale of improvement (COSI) questionnaire was completed. Based on these results, Mr. Schmidt's prioritized communication difficulties include:     1.) Hearing better in crowds/restaurants/background noise. 2.) Hearing his wife better. Strengths and limitations of amplification, including various hearing aid styles, options, and circuitry were discussed at length with Mr. Samra Cobb. Based on the degree of his hearing loss, preferences, and lifestyle needs, a trial with [employer] REAL 1-R was recommended with size 3 85dB receivers and  8mm open domes. Mr. Samra Cobb preferred the Chroma Beige form factor color. [facility]'s office policies regarding our hearing aid program were reviewed, including the 45 day trial period, non-refundable fees, as well as the  warranties. At this time, Mr. Samra Cobb wished to proceed with purchase of the above hearing aids. Devices ordered as specified (confirmation # [id number]). He will return for a hearing aid fitting on 11/13/2023. It was a pleasure working with Mr. Samra Cobb. They were encouraged to contact the clinic with any further questions or concerns in the meantime.      ORTEGA Lan  3rd year Audiology student    [address] [address]

## 2023-11-13 ENCOUNTER — OFFICE VISIT (OUTPATIENT)
Dept: AUDIOLOGY | Facility: CLINIC | Age: 63
End: 2023-11-13
Payer: COMMERCIAL

## 2023-11-13 DIAGNOSIS — H90.3 SENSORY HEARING LOSS, BILATERAL: Primary | ICD-10-CM

## 2023-11-13 PROCEDURE — V5160 DISPENSING FEE BINAURAL: HCPCS | Performed by: AUDIOLOGIST

## 2023-11-13 PROCEDURE — V5261 HEARING AID, DIGIT, BIN, BTE: HCPCS | Performed by: AUDIOLOGIST

## 2023-11-13 NOTE — PROGRESS NOTES
[place] Hearing Aid Fitting    Chana Rodrigez was seen today (11/13/2023) for a binaural hearing aid fitting of his [address] 1-R  in the canal (ANGIE) hearing aid(s). Mr. Amara Underwood was unaccompanied to today's visit    Device Information    Hearing Aid Fitting Date: 11/13/2023     Left Device Right Device   Hearing Aid Make: Franco Murray   Hearing Aid Model: REAL 1-R REAL 1-R   Serial Number: [id number]   Repair Warranty Expiration Date: 12/6/2026 12/6/2026   Loss/Damage Warranty Expiration Date:  12/6/2026 12/6/2026    Length: 3 3    Output: 85dB 85dB   Wax System: [employer] miniFIT   Dome Size/Style: 8mm open 8mm open   Battery: Lithium-ion Rechargeable Lithium-ion Rechargeable   Earmold Serial Number: N/A N/A   [place] Date:  N/A N/A      Serial Number:  [id number]  Accessories: N/A      Initial Hearing Aid Settings    Hearing aid(s) were programmed using Drive software's VAC+ fitting formula and were validated by Mr. Amara Underwood for perceived comfort levels. [place] program set with full directional microphone input  Manual control button was deactivated  Hearing aids set to experience level of 90%  per Mr. Schmidt's subjective listening preference    Device Orientation    Mr. Amara Underwood was counseled on device components and component function. Proper insertion and removal of the aid(s) was demonstrated. The importance of realistic expectations with expectation, especially in the presence of background noise, was emphasized. Hearing aids are assistive devices and are not designed to restore normal hearing sensitivity. The need for daily consistent usage (8-12 hours per day) for proper device acclimatization, as well as the importance of self-advocacy and practicing effective communication strategies was outlined.      Effective communication strategies include:  1.) Maintaining face-to-face communication, allowing for speechreading of facial expressions, lips, and gestures. 2.) Reducing background noise and distance between communication partners. 3.) Having communication partners reduce their rate of speech when appropriate. 4.) Beginning conversation by getting communication partner's attention and stating the topic, allowing for context clues to help fill in gaps in comprehension. 5.) Asking for rephrasing of missed aspects of conversation rather than asking for repetitions. Mr. Cardona was given the information booklet (or QR code pamphlet to the booklet) regarding hearing aid usage and operation, hearing aid cleaning tools, and hearing aid carrying case. Hearing aid repair and loss and damage warranties offered through the  were outlined thoroughly. Mr. Cardona agreed to the terms of sale listed on the purchase agreement containing device specifications, warranties, pricing information, as well as [facility] 45-day trial period timeline. After this period has elapsed, hearing aids cannot be returned. Recommendations & Follow-up    Mr. Cardona demonstrated understanding of the topics discussed. It appears that he has realistic expectations regarding the benefits and limitations with the use of amplification. The prognosis for successful hearing aid use is good. While improvement with amplification is expected, there will always be word understanding problems due to the inherent nature of hearing loss. These problems will be greater in background noise and adverse listening environments than in quiet situations. Mr. Cardona is to follow-up in 2-3 weeks for a hearing aid check within the trial period as scheduled. At this time of Mr. Schmidt's next visit, the following topics should be reviewed: wax guard removal/replacement, increasing adaptation/gain settings after initial acclimatization, activation of push button/toggle switches for manual volume control, as well as noise reductive features in [employer]   Genie 2 software. The above recommendations were discussed with Mr. Domenico Lantigua. It was a pleasure working with Mr. Domenico Lantigua today. He was encouraged to contact the clinic with any further questions/concerns in the meantime.     ORTEGA Law  3rd year Audiology student    [address] [address]

## 2023-11-13 NOTE — PROGRESS NOTES
[place] Hearing Aid Fitting    Kanchan Youngblood was seen today (11/13/2023) for a binaural hearing aid fitting of his [address] 1-R  in the canal (ANGIE) hearing aid(s). Mr. Donna Jerry was unaccompanied to today's visit    Device Information    Hearing Aid Fitting Date: 11/13/2023     Left Device Right Device   Hearing Aid Make: Roland Sylwia   Hearing Aid Model: REAL 1-R REAL 1-R   Serial Number: [email or web address]   Repair Warranty Expiration Date: 12/6/2026 12/6/2026   Loss/Damage Warranty Expiration Date:  12/6/2026 12/6/2026    Length: 3 3    Output: 85dB 85dB   Wax System: [employer] miniFIT   Dome Size/Style: 8mm open 8mm open   Battery: Lithium-ion Rechargeable Lithium-ion Rechargeable   Earmold Serial Number: N/A N/A   [place] Later Date:  N/A N/A      Serial Number:  [id number]  Accessories: N/A      Initial Hearing Aid Settings    Hearing aid(s) were programmed using [employer] software's VAC+ fitting formula and were validated by Mr. Donna Jerry for perceived comfort levels. [place] program set with full directional microphone input  Manual control button was deactivated  Hearing aids set to experience level of 90%  per Mr. Schmidt's subjective listening preference    Device Orientation    Mr. Donna Jerry was counseled on device components and component function. Proper insertion and removal of the aid(s) was demonstrated. The importance of realistic expectations with expectation, especially in the presence of background noise, was emphasized. Hearing aids are assistive devices and are not designed to restore normal hearing sensitivity. The need for daily consistent usage (8-12 hours per day) for proper device acclimatization, as well as the importance of self-advocacy and practicing effective communication strategies was outlined.      Effective communication strategies include:  1.) Maintaining face-to-face communication, allowing for speechreading of facial expressions, lips, and gestures. 2.) Reducing background noise and distance between communication partners. 3.) Having communication partners reduce their rate of speech when appropriate. 4.) Beginning conversation by getting communication partner's attention and stating the topic, allowing for context clues to help fill in gaps in comprehension. 5.) Asking for rephrasing of missed aspects of conversation rather than asking for repetitions. Mr. EDWARDSLTAC, located within [facility] was given the information booklet (or QR code pamphlet to the booklet) regarding hearing aid usage and operation, hearing aid cleaning tools, and hearing aid carrying case. Hearing aid repair and loss and damage warranties offered through the  were outlined thoroughly. Mr. EDWARDSLTAC, located within [facility] agreed to the terms of sale listed on the purchase agreement containing device specifications, warranties, pricing information, as well as [facility] 45-day trial period timeline. After this period has elapsed, hearing aids cannot be returned. Recommendations & Follow-up    [facility] demonstrated understanding of the topics discussed. It appears that he has realistic expectations regarding the benefits and limitations with the use of amplification. The prognosis for successful hearing aid use is good. While improvement with amplification is expected, there will always be word understanding problems due to the inherent nature of hearing loss. These problems will be greater in background noise and adverse listening environments than in quiet situations. Mr. EDWARDSLTAC, located within [facility] is to follow-up in 2-3 weeks for a hearing aid check within the trial period as scheduled. At this time of Mr. Schmidt's next visit, the following topics should be reviewed: wax guard removal/replacement, increasing adaptation/gain settings after initial acclimatization, activation of push button/toggle switches for manual volume control, as well as noise reductive features in [employer]   Genie 2 software. The above recommendations were discussed with Mr. Ariana Emery. It was a pleasure working with Mr. Ariana Emery today. He was encouraged to contact the clinic with any further questions/concerns in the meantime.     ORTEGA Vuong  3rd year Audiology student    [address] [id number]

## 2023-11-21 DIAGNOSIS — E78.49 FAMILIAL HYPERLIPIDEMIA: ICD-10-CM

## 2023-11-21 RX ORDER — ATORVASTATIN CALCIUM 10 MG/1
10 TABLET, FILM COATED ORAL DAILY
Qty: 90 TABLET | Refills: 1 | Status: SHIPPED | OUTPATIENT
Start: 2023-11-21

## 2023-11-28 ENCOUNTER — OFFICE VISIT (OUTPATIENT)
Dept: AUDIOLOGY | Facility: CLINIC | Age: 63
End: 2023-11-28

## 2023-11-28 ENCOUNTER — OFFICE VISIT (OUTPATIENT)
Dept: OTOLARYNGOLOGY | Facility: CLINIC | Age: 63
End: 2023-11-28
Payer: COMMERCIAL

## 2023-11-28 VITALS — BODY MASS INDEX: 29.69 KG/M2 | WEIGHT: 224 LBS | TEMPERATURE: 97.4 F | HEIGHT: 73 IN

## 2023-11-28 DIAGNOSIS — H90.3 SENSORINEURAL HEARING LOSS (SNHL), BILATERAL: Primary | ICD-10-CM

## 2023-11-28 DIAGNOSIS — H93.13 BILATERAL TINNITUS: ICD-10-CM

## 2023-11-28 DIAGNOSIS — H61.21 RIGHT EAR IMPACTED CERUMEN: ICD-10-CM

## 2023-11-28 DIAGNOSIS — H90.3 SENSORY HEARING LOSS, BILATERAL: Primary | ICD-10-CM

## 2023-11-28 PROCEDURE — 69210 REMOVE IMPACTED EAR WAX UNI: CPT | Performed by: NURSE PRACTITIONER

## 2023-11-28 PROCEDURE — 99204 OFFICE O/P NEW MOD 45 MIN: CPT | Performed by: NURSE PRACTITIONER

## 2023-11-28 NOTE — PROGRESS NOTES
Assessment/Plan:    Right ear impacted cerumen    On exam noted right cerumen impaction and unable to fully view tympanic membrane. Cerumen impaction removed right eac with irrigation, alligator forceps and suction, pt tolerated procedure well. Trace cerumen removed left ear with #5 suction. Upon removal, improved hearing and decreased clogged sensation of bilateral ears. Discussed routine cerumen care including avoidance of q-tips, may use cerumen softeners every one to two months. Hydrocortisone cream pea sized amount on finger as needed for itching in ears. Encourage ongoing follow up annually to monitor for cerumen and hearing. Audiogram if symptoms worsen. Continue binaural hearing aids obtained through [facility] audiology        Bilateral tinnitus  Reviewed actual audiogram results with patient, indicating bilateral SNHL, slight asymmetry on left at 4K, 25 db difference. Recommend repeat audiogram annually to monitor for any asymmetry changes. Reviewed tinnitus and association with SNHL. Tinnitus and possible causes including medications, viral illness, inner ear disorder, TMJ syndrome, or hearing changes. Options for bilateral tinnitus including adding background noise, tinnitus retraining therapy, masking device, Resound tinnitus tee, Acupuncture, or acceptance. Limit caffeine and ibuprofen intake. Discouraged q-tip use due to damage of ear canal.  Consider Flonase daily for eustachian tube dysfunction. No specific medications or surgery indicated for treatment of tinnitus. Consider MRI imaging if worsens        Diagnoses and all orders for this visit:    Sensorineural hearing loss (SNHL), bilateral    Bilateral tinnitus    Right ear impacted cerumen  -     Ear cerumen removal          Subjective:      Patient ID: Danie Caicedo is a 58 y.o. male. Presents today as a new patient due to ear concerns. Hearing gradually worsening. Bilateral ears feel blocked. Bilateral ringing tinnitus. Since obtained hearing aids noticed tinnitus more often. Occasional ear discomfort. No otorrhea. No history of ear surgery. Recently obtained hearing aids. History noise exposure in work setting. The following portions of the patient's history were reviewed and updated as appropriate: allergies, current medications, past family history, past medical history, past social history, past surgical history, and problem list.    Review of Systems   Constitutional: Negative. HENT:  Positive for hearing loss and tinnitus. Negative for congestion, ear discharge, ear pain, nosebleeds, postnasal drip, rhinorrhea, sinus pressure, sinus pain, sore throat and voice change. Respiratory:  Negative for chest tightness and shortness of breath. Skin:  Negative for color change. Neurological:  Negative for dizziness, numbness and headaches. Psychiatric/Behavioral: Negative. Objective:      Temp (!) 97.4 °F (36.3 °C) (Temporal)   Ht 6' 0.5" (1.842 m)   Wt 102 kg (224 lb)   BMI 29.96 kg/m²          Physical Exam  Constitutional:       Appearance: He is well-developed. HENT:      Head: Normocephalic. Right Ear: Hearing, tympanic membrane, ear canal and external ear normal. No decreased hearing noted. No drainage or tenderness. There is impacted cerumen. Tympanic membrane is not perforated or erythematous. Left Ear: Hearing, tympanic membrane, ear canal and external ear normal. No decreased hearing noted. No drainage or tenderness. Tympanic membrane is not perforated or erythematous. Nose: Nose normal. No nasal deformity or septal deviation. Mouth/Throat:      Mouth: Mucous membranes are not pale and not dry. No oral lesions. Dentition: Normal dentition. Pharynx: Uvula midline. No oropharyngeal exudate. Neck:      Trachea: No tracheal deviation. Pulmonary:      Effort: No accessory muscle usage or respiratory distress.    Musculoskeletal:      Cervical back: Neck supple. Lymphadenopathy:      Cervical: No cervical adenopathy. Skin:     General: Skin is warm and dry. Neurological:      Mental Status: He is alert and oriented to person, place, and time. Cranial Nerves: No cranial nerve deficit. Sensory: No sensory deficit. Psychiatric:         Behavior: Behavior is cooperative. Ear cerumen removal    Date/Time: 11/28/2023 2:30 PM    Performed by: ISABEL Angulo  Authorized by: ISABEL Angulo  Universal Protocol:  Consent: Verbal consent obtained. Risks and benefits: risks, benefits and alternatives were discussed  Consent given by: patient  Patient understanding: patient states understanding of the procedure being performed    Patient location:  Clinic  Procedure details:     Local anesthetic:  None    Location:  R ear    Approach:  External  Post-procedure details:     Complication:  None    Hearing quality:  Normal    Patient tolerance of procedure:   Tolerated well, no immediate complications

## 2023-11-28 NOTE — PROGRESS NOTES
[facility] AUDIOLOGY HEARING AID CHECK    Harriet Valentine was seen today (11/28/2023) for a(n) in-warranty hearing aid check of his bilateral hearing aids. Today, Mr. Edwin Dowell reported that he is experiencing a bit of irritation where the form factor sits next to his glasses arm and that he is still getting used to this. Mr. Edwin Dowell also reported that he is experiencing some itching from the canal and that it appears his tinnitus is more prominent since starting to wear hearing aids. Finally, Mr. Edwin Dowell is still asking people to repeat so his family is questioning whether he is wearing the hearing aids or not. Mr. Edwin Dowell also had questions with regard to bluetooth streaming as it relates to how audio routing populates in his phone during calls. Otoscopy revealed Unremarkable, canal clear. Device Information    Hearing Aid Fitting Date: 11/13/2023     Left Device Right Device   Hearing Aid Make: Gilberto Pires   Hearing Aid Model: REAL 1-R REAL 1-R   Serial Number: Carmell [id number]   Repair Warranty Expiration Date: 12/6/2026 12/6/2026   Loss/Damage Warranty Expiration Date:  12/6/2026 12/6/2026    Length: 3 3    Output: 85dB 85dB   Wax System: Revo Round miniFIT Pidefarmax miniFIT   Dome Size/Style: 8mm open 8mm open   Battery: Lithium-ion Rechargeable Lithium-ion Rechargeable   Earmold Serial Number: N/A N/A   [place] Date:  N/A N/A      Serial Number: [id number]  Accessories: N/A    Visual inspection of the device(s) revealed no noticeable damage or defects. A listening check revealed good sound quality from the device(s). Changes to Device(s)    Domes were changed from 8mm open to 10mm open. Sport locks/stabilizers were placed on both aids. Real Ear Measurements (REM) were performed bilaterally using NAL-NL2 prescriptive targets. Targets were approximated for soft, medium, and loud speech at 55, 65, 75 decibels sound pressure level (SPL), respectively.  Maximum Power Output (MPO) was within the predicted range of comfort. Subjective listening comfort validated by  [facility]. Target prescription was increased from 90% to 100% prescription. [address] [facility] contacted. Hearing aids will only list as "Bluetooth" in Mr. Schmidt's phone model. There is no way to distinguish between hearing aids and pixel watch currently. Recommendations & Follow-up    Hearing aids(s) are in good working condition. Mr. GASPAR [facility] stated that he has no further questions with his hearing aid(s) and does not feed that any other adjustments are needed at this time. He will follow-up in 1 week to see that the adjustments made at today's appointment are beneficial.     It was a [facility]. He was encouraged to contact the clinic with any further questions in the meantime. Day Christopher, ORTEGA  3rd year Audiology student    [address] [phone]    Trinity Prasad.   Clinical Audiologist    [address] [address]

## 2023-11-28 NOTE — PATIENT INSTRUCTIONS
Tinnitus and possible causes including medications, viral illness, inner ear disorder, TMJ syndrome, or hearing changes. Options for bilateral tinnitus including adding background noise, tinnitus retraining therapy, masking device, Resound tinnitus tee, Acupuncture, or acceptance. Limit caffeine and ibuprofen intake. Discouraged q-tip use due to damage of ear canal.  Consider Flonase daily for eustachian tube dysfunction. No specific medications or surgery indicated for treatment of tinnitus. Consider MRI imaging if worsens     Wax care at home - avoidance of q-tips, may use cerumen softeners every one to two months. Hydrocortisone 1% cream pea sized amount on finger as needed at bedtime for itching in ears.

## 2023-11-29 NOTE — ASSESSMENT & PLAN NOTE
On exam noted right cerumen impaction and unable to fully view tympanic membrane. Cerumen impaction removed right eac with irrigation, alligator forceps and suction, pt tolerated procedure well. Trace cerumen removed left ear with #5 suction. Upon removal, improved hearing and decreased clogged sensation of bilateral ears. Discussed routine cerumen care including avoidance of q-tips, may use cerumen softeners every one to two months. Hydrocortisone cream pea sized amount on finger as needed for itching in ears. Encourage ongoing follow up annually to monitor for cerumen and hearing. Audiogram if symptoms worsen.  Continue binaural hearing aids obtained through [address] [place] [address] audiology

## 2023-11-29 NOTE — ASSESSMENT & PLAN NOTE
Reviewed actual audiogram results with patient, indicating bilateral SNHL, slight asymmetry on left at 4K, 25 db difference. Recommend repeat audiogram annually to monitor for any asymmetry changes. Reviewed tinnitus and association with SNHL. Tinnitus and possible causes including medications, viral illness, inner ear disorder, TMJ syndrome, or hearing changes. Options for bilateral tinnitus including adding background noise, tinnitus retraining therapy, masking device, Resound tinnitus tee, Acupuncture, or acceptance. Limit caffeine and ibuprofen intake. Discouraged q-tip use due to damage of ear canal.  Consider Flonase daily for eustachian tube dysfunction. No specific medications or surgery indicated for treatment of tinnitus.   Consider MRI imaging if worsens

## 2023-12-05 ENCOUNTER — OFFICE VISIT (OUTPATIENT)
Dept: AUDIOLOGY | Facility: CLINIC | Age: 63
End: 2023-12-05

## 2023-12-05 DIAGNOSIS — H90.3 SENSORY HEARING LOSS, BILATERAL: Primary | ICD-10-CM

## 2023-12-05 NOTE — PROGRESS NOTES
Chief Complaint   Patient presents with   • Hernia     ventral hernia.        Hernia   This is a chronic problem. The current episode started more than 1 year ago. The problem occurs constantly. The problem has been gradually worsening. Associated symptoms include abdominal pain, anorexia, arthralgias, a change in bowel habit, diaphoresis, fatigue, nausea, vomiting and weakness. Pertinent negatives include no chest pain, chills, congestion, coughing, fever, headaches, joint swelling, myalgias, neck pain, numbness, rash, sore throat, swollen glands, urinary symptoms, vertigo or visual change. Nothing aggravates the symptoms. Treatments tried: Weight loss of 32 pounds. The treatment provided moderate relief.   Coumadin for bilateral DVT and has an IVC filter in place.    Past Medical History:   Diagnosis Date   • Abnormal liver function test    • Acquired hypothyroidism    • Acute anterior uveitis     improved os   • Acute bronchitis    • Adenomatous polyposis coli    • Allergic rhinitis    • Anxiety    • Artificial lens present     IN POSITION   • Artificial lens present     s/p CE/IOL, anterior vitrectomy OS; doing well     • Asthma    • Benign polyp of large intestine    • Chest pain, unspecified    • Chronic persistent hepatitis    • Cortical senile cataract    • Cough    • Diabetes mellitus     NO RETINOPATHY   • Epigastric pain    • Essential hypertension    • Helicobacter positive gastritis    • History of echocardiogram 02/02/2016    Echocardiogram W/ color flow 40151 (Chest pain, unspecified)    • History of echocardiogram 02/26/2016    Echocardiogram W/ color flow 92446 (Normal LV function with Ef of 65%.Normal RV size and function.Normal diastolic function with borderline CLVH.No significant valvular regurg.)   • Hyperlipidemia    • Incisional hernia    • Left ventricular hypertrophy    • Long term use of drug     THERAPY   • Malignant neoplasm of colon    • Malignant tumor of colon    • Morbid obesity    •  [facility] AUDIOLOGY HEARING AID CHECK    Waqasluz maria Harden was seen today (12/5/2023) for a(n) in-warranty hearing aid check of his bilateral hearing aids. Today, Mr. Indiana Ardon  reported that he is hearing things better than before but now the feeling as if he hears background noise more than speakers of interest and high pitch sounds are too tinny. Otoscopy revealed Unremarkable, canal clear. Device Information    Hearing Aid Fitting Date: 11/13/2023     Left Device Right Device   Hearing Aid Make: Lexi Covarrubias   Hearing Aid Model: REAL 1-R REAL 1-R   Serial Number: [id number]   Repair Warranty Expiration Date: 12/6/2026 12/6/2026   Loss/Damage Warranty Expiration Date:  12/6/2026 12/6/2026    Length: 3 3    Output: 85dB 85dB   Wax System: F[employer] ProWax miniFIT   Dome Size/Style: 8mm open 8mm open   Battery: Lithium-ion Rechargeable Lithium-ion Rechargeable   Earmold Serial Number: N/A N/A   Reji Moe Date:  N/A N/A      Serial Number: [id number]  Accessories: N/A    Visual inspection of the device(s) revealed no noticeable damage or defects. A listening check revealed good sound quality from the device(s). Changes to Device(s)    Neural Suppression for both Easy and Difficult listening situations were increased; Easy: moved from 0dB to 2dB suppression; Difficult: moved from 8dB to 10dB suppression. Sound Enhancement was changed from Balanced to Comfort in difficult listening situations. MPO reduced globally 2 steps and in HF by an additional 2 steps  Data Logging was activated    Mr. Indiana Ardon was shown how to make additional adjustments to treble/bass, low/mid/high frequency tones through the Bangee. After today's changes Mr. Indiana Ardon endorsed significantly improved subjective listening comfort to noise (e.g.,paper crumpling, clapping, etc.)    Recommendations & Follow-up    Hearing aids(s) are in good working condition.  Mr. 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 Takedown of splenic flexure. Laparoscopic colectomy discontinued.   • COLONOSCOPY  03/21/2014    1 medium-sized sessile polyp in splenic flexure. Biopsy taken. Chromoscopyprocedure performed.   • COLONOSCOPY W/ POLYPECTOMY  05/27/2016    Colonoscopy remove polyps 11789 (One polyp in the transverse colon.Resected and retrieved.One polyp in the ascending colon. Resected and retrieved.)   • COLONOSCOPY W/ POLYPECTOMY  07/10/2015    Colonoscopy remove polyps 07985 (A few polyps found in the cecum and ascending colon; removed by snare cautery polypectomy.)   • ENDOSCOPY  05/27/2016    EGD w/ biopsy 35125 (Gastritis.Normal examined duodenum. Biopsied.Normal esophagus.A single gastric polyp.Biopsied.Several biopsies obtained in the gastric antrum.)   • ENDOSCOPY  03/21/2014    EGD w/ tube 80194 (Normal esophagus. Gastritis in stomach. Biopsy taken. Normal duodenum. Biopsy taken.)   • ENDOSCOPY N/A 2/8/2017    Procedure: ESOPHAGOGASTRODUODENOSCOPY;  Surgeon: Osbaldo Gong MD;  Location: [facility] ENDOSCOPY;  Service:    • INJECTION OF MEDICATION  04/28/2016    Kenalog (3)      • OTHER SURGICAL HISTORY  12/04/2014    OPTICAL BIOMETRY 80493 (Cortical  he has no further questions with his hearing aid(s) and does not feel that any other adjustments are needed at this time. He will follow-up in 1 month to see that the adjustments made at today's appointment are beneficial.     It was a pleasure working with Mr. Mandi Higgins today. He was encouraged to contact the clinic with any further questions in the meantime. Mathew Renteria BS  3rd year Audiology student    [address] [address]    Trinity Abel.   Clinical Audiologist    [address] [phone] senile cataract)          Current Outpatient Prescriptions:   •  albuterol (PROVENTIL HFA;VENTOLIN HFA) 108 (90 BASE) MCG/ACT inhaler, Inhale 2 puffs Every 4 (Four) Hours As Needed for wheezing., Disp: , Rfl:   •  citalopram (CeleXA) 40 MG tablet, , Disp: , Rfl:   •  clonazePAM (KlonoPIN) 1 MG tablet, , Disp: , Rfl:   •  doxycycline (MONODOX) 100 MG capsule, , Disp: , Rfl:   •  gabapentin (NEURONTIN) 300 MG capsule, , Disp: , Rfl:   •  ibuprofen (ADVIL,MOTRIN) 800 MG tablet, , Disp: , Rfl:   •  levothyroxine (SYNTHROID, LEVOTHROID) 50 MCG tablet, , Disp: , Rfl:   •  loperamide (IMODIUM) 2 MG capsule, Take 1 capsule by mouth 4 (Four) Times a Day As Needed for Diarrhea., Disp: 16 capsule, Rfl: 0  •  losartan-hydrochlorothiazide (HYZAAR) 50-12.5 MG per tablet, Take 1 tablet by mouth Daily., Disp: , Rfl:   •  metFORMIN (GLUCOPHAGE) 1000 MG tablet, , Disp: , Rfl:   •  omeprazole (PriLOSEC) 40 MG capsule, Take 40 mg by mouth Daily., Disp: , Rfl:   •  pravastatin (PRAVACHOL) 40 MG tablet, Take 40 mg by mouth., Disp: , Rfl:   •  promethazine (PHENERGAN) 25 MG tablet, Take 1 tablet by mouth Every 6 (Six) Hours As Needed for Nausea or Vomiting., Disp: 20 tablet, Rfl: 0  •  QUEtiapine (SEROquel) 25 MG tablet, Take 25 mg by mouth Every Night., Disp: , Rfl:   •  warfarin (COUMADIN) 3 MG tablet, Take 3 mg by mouth Every Night., Disp: , Rfl:   •  FLUoxetine (PROZAC) 40 MG capsule, Take 40 mg by mouth Every Night., Disp: , Rfl:   •  METFORMIN HCL PO, Take  by mouth., Disp: , Rfl:     Allergies   Allergen Reactions   • Codeine Nausea And Vomiting   • Latex      Blisters     • Lortab [Hydrocodone-Acetaminophen] Nausea And Vomiting   • Penicillins Hives       No family history on file.    Social History     Social History   • Marital status:      Spouse name: N/A   • Number of children: N/A   • Years of education: N/A     Occupational History   • Not on file.     Social History Main Topics   • Smoking status: Never Smoker   •  Smokeless tobacco: Not on file   • Alcohol use No   • Drug use: Unknown   • Sexual activity: Not on file     Other Topics Concern   • Not on file     Social History Narrative   • No narrative on file       Review of Systems   Constitutional: Positive for diaphoresis and fatigue. Negative for appetite change, chills, fever and unexpected weight change.   HENT: Negative for congestion, hearing loss, nosebleeds, sore throat and trouble swallowing.    Eyes: Negative for visual disturbance.   Respiratory: Negative for apnea, cough, choking, chest tightness, shortness of breath, wheezing and stridor.    Cardiovascular: Negative for chest pain, palpitations and leg swelling.   Gastrointestinal: Positive for abdominal pain, anorexia, change in bowel habit, nausea and vomiting. Negative for abdominal distention, blood in stool, constipation and diarrhea.   Endocrine: Negative for cold intolerance, heat intolerance, polydipsia, polyphagia and polyuria.   Genitourinary: Negative for difficulty urinating, dysuria, frequency, hematuria and urgency.   Musculoskeletal: Positive for arthralgias. Negative for back pain, joint swelling, myalgias and neck pain.   Skin: Negative for color change, pallor and rash.   Allergic/Immunologic: Negative for immunocompromised state.   Neurological: Positive for weakness. Negative for dizziness, vertigo, seizures, syncope, light-headedness, numbness and headaches.   Hematological: Negative for adenopathy.   Psychiatric/Behavioral: Negative for suicidal ideas. The patient is not nervous/anxious.        Physical Exam   Constitutional: She is oriented to person, place, and time. She appears well-developed and well-nourished. No distress.   HENT:   Head: Normocephalic and atraumatic.   Mouth/Throat: No oropharyngeal exudate.   Eyes: Conjunctivae are normal. Pupils are equal, round, and reactive to light. No scleral icterus.   Neck: Normal range of motion. Neck supple. No JVD present. No tracheal  deviation present. No thyromegaly present.   Cardiovascular: Normal rate, regular rhythm and normal heart sounds.    Pulmonary/Chest: Effort normal and breath sounds normal. No stridor. No respiratory distress. She has no wheezes. She has no rales. She exhibits no tenderness.   Abdominal: Soft. Bowel sounds are normal. She exhibits no distension and no mass. There is no tenderness. There is no rebound and no guarding. A hernia is present.       Musculoskeletal: Normal range of motion. She exhibits no edema, tenderness or deformity.   Lymphadenopathy:     She has no cervical adenopathy.     She has no axillary adenopathy.        Right: No inguinal and no supraclavicular adenopathy present.        Left: No inguinal and no supraclavicular adenopathy present.   Neurological: She is alert and oriented to person, place, and time.   Skin: Skin is warm, dry and intact. No rash noted. She is not diaphoretic. No erythema. No pallor.   Psychiatric: She has a normal mood and affect. Her behavior is normal. Judgment and thought content normal.   Vitals reviewed.        ASSESSMENT    Tata was seen today for hernia.    Diagnoses and all orders for this visit:    Type 2 diabetes mellitus without complication, without long-term current use of insulin  -     Hemoglobin A1c; Future  -     CBC & Differential; Future  -     Comprehensive Metabolic Panel; Future  -     Protime-INR; Future    Incisional hernia, without obstruction or gangrene    Hypercholesterolemia    Hypothyroidism due to acquired atrophy of thyroid    Essential hypertension        PLAN    1. Lab check tomorrow  2. Recheck Wednesday          This document has been electronically signed by Hardy Garner MD on April 30, 2018 2:10 PM

## 2024-01-08 ENCOUNTER — OFFICE VISIT (OUTPATIENT)
Dept: AUDIOLOGY | Facility: CLINIC | Age: 64
End: 2024-01-08

## 2024-01-08 DIAGNOSIS — H90.3 SENSORINEURAL HEARING LOSS (SNHL), BILATERAL: Primary | ICD-10-CM

## 2024-01-08 NOTE — PROGRESS NOTES
[facility] AUDIOLOGY HEARING AID CHECK    Sudeep Schmidt was seen today (1/8/2024) for a(n) in-warranty hearing aid check of his bilateral hearing aids. Today, Mr. Schmidt reported that he was still getting used to both devices, but that he noticed his hearing had markedly improved (e.g,. wife noted that he can hear whispers in the car). He noted that he wished music quality was better.     Otoscopy revealed Unremarkable, canal clear.     Device Information    Hearing Aid Fitting Date: 11/13/2023     Left Device Right Device   Hearing Aid Make: OtGreenWizard Oticon   Hearing Aid Model: REAL 1-R REAL 1-R   Serial Number: [id number]   Repair Warranty Expiration Date: 12/6/2026 12/6/2026   Loss/Damage Warranty Expiration Date:  12/6/2026 12/6/2026    Length: 3 3    Output: 85dB 85dB   Wax System: [employer] miniFIT ProWax miniFIT   Dome Size/Style: 8mm open 8mm open   Battery: Lithium-ion Rechargeable Lithium-ion Rechargeable   Earmold Serial Number: N/A N/A   Earmold Warranty Date:  N/A N/A      Serial Number: [id number]  Accessories: N/A    Visual inspection of the device(s) revealed no noticeable damage or defects.     A listening check revealed good sound quality from the device(s).    Changes to Device(s)     changed to 8mm double bass dome. Feedback manager reperformed.   Sudden sound stabilizer increased from moderate to very high   MyMusic program added. Mr. Schmidt was shown equalizer settings.      Mr. Schmidt was shown how to make additional adjustments to treble/bass, low/mid/high frequency tones through the Oticon  Amanda. After today's changes Mr. Schmidt endorsed significantly improved subjective listening comfort to noise (e.g.,paper crumpling, clapping, etc.)    Recommendations & Follow-up    Hearing aids(s) are in good working condition. Mr. Schmidt stated that he has no further questions with his hearing aid(s) and does not feel that any other adjustments are needed  at this time. He will follow-up in 1 month to see that the adjustments made at today's appointment are beneficial.     It was a pleasure working with Mr. Schmidt today. He was encouraged to contact the clinic with any further questions in the meantime.    Trinity Barnes.  Clinical Audiologist    [facility]OLOGY  [address] [facility] [address]

## 2024-01-27 PROBLEM — H61.21 RIGHT EAR IMPACTED CERUMEN: Status: RESOLVED | Noted: 2023-11-28 | Resolved: 2024-01-27

## 2024-02-05 ENCOUNTER — OFFICE VISIT (OUTPATIENT)
Dept: AUDIOLOGY | Facility: CLINIC | Age: 64
End: 2024-02-05

## 2024-02-05 DIAGNOSIS — H90.3 SENSORINEURAL HEARING LOSS (SNHL), BILATERAL: Primary | ICD-10-CM

## 2024-02-05 NOTE — PROGRESS NOTES
"[facility] AUDIOLOGY HEARING AID CHECK    Sudeep Schmidt was seen today (2/5/2024) for a(n) in-warranty hearing aid check of his bilateral hearing aids. Today, Mr. Schmidt stated that he was not hearing well after his last adjustments and  change. He noted that it feels as if he is \"at the bottom of a well.\"    Otoscopy revealed Unremarkable, canal clear.     Device Information    Hearing Aid Fitting Date: 11/13/2023     Left Device Right Device   Hearing Aid Make: Zendrive Otzerobound   Hearing Aid Model: REAL 1-R REAL 1-R   Serial Number: [id number]   Repair Warranty Expiration Date: 12/6/2026 12/6/2026   Loss/Damage Warranty Expiration Date:  12/6/2026 12/6/2026    Length: 3 3    Output: 85dB 85dB   Wax System: "[employer] miniFIT ProWax miniFIT   Dome Size/Style: 8mm open 8mm open   Battery: Lithium-ion Rechargeable Lithium-ion Rechargeable   Earmold Serial Number: N/A N/A   Earmold Warranty Date:  N/A N/A      Serial Number: [id number]  Accessories: N/A    Visual inspection of the device(s) revealed no noticeable damage or defects.     A listening check revealed good sound quality from the device(s).    Changes to Device(s)     reverted to 8mm open bass dome. Feedback manager reperformed. Mr. Schmidt was counseled on the pros/cons between the open and closed . With the open , he has better perceived comfort. With the closed , feedback headroom improves. He preferred the former.  All other settings reviewed with Mr. Schmidt- he was also counseled on effective communication strategies to avoid communication breakdowns.    Recommendations & Follow-up    Hearing aids(s) are in good working condition. Mr. Schmidt stated that he has no further questions with his hearing aid(s) and does not feel that any other adjustments are needed at this time. He will follow-up in 2-3 months after his road trip to [place] for a wedding to review adjustments.     It was a pleasure " working with Mr. Schmidt today. He was encouraged to contact the clinic with any further questions in the meantime.    Trinity aBrnes.  Clinical Audiologist    [facility] AUD[facility]OLOG[facility]  [address] [address]

## 2024-03-18 ENCOUNTER — OFFICE VISIT (OUTPATIENT)
Dept: AUDIOLOGY | Facility: CLINIC | Age: 64
End: 2024-03-18

## 2024-03-18 DIAGNOSIS — H90.3 SENSORINEURAL HEARING LOSS (SNHL), BILATERAL: Primary | ICD-10-CM

## 2024-05-27 DIAGNOSIS — E78.49 FAMILIAL HYPERLIPIDEMIA: ICD-10-CM

## 2024-05-27 RX ORDER — ATORVASTATIN CALCIUM 10 MG/1
10 TABLET, FILM COATED ORAL DAILY
Qty: 90 TABLET | Refills: 1 | Status: SHIPPED | OUTPATIENT
Start: 2024-05-27

## 2024-06-13 DIAGNOSIS — Z13.29 SCREENING FOR THYROID DISORDER: ICD-10-CM

## 2024-06-13 DIAGNOSIS — E78.49 FAMILIAL HYPERLIPIDEMIA: Primary | ICD-10-CM

## 2024-06-13 DIAGNOSIS — Z00.00 ENCOUNTER FOR ANNUAL GENERAL MEDICAL EXAMINATION WITHOUT ABNORMAL FINDINGS IN ADULT: ICD-10-CM

## 2024-06-13 DIAGNOSIS — R39.14 BENIGN PROSTATIC HYPERPLASIA WITH INCOMPLETE BLADDER EMPTYING: ICD-10-CM

## 2024-06-13 DIAGNOSIS — N40.1 BENIGN PROSTATIC HYPERPLASIA WITH INCOMPLETE BLADDER EMPTYING: ICD-10-CM

## 2024-06-13 DIAGNOSIS — I10 PRIMARY HYPERTENSION: ICD-10-CM

## 2024-06-13 DIAGNOSIS — E11.9 TYPE 2 DIABETES MELLITUS WITHOUT COMPLICATION, WITHOUT LONG-TERM CURRENT USE OF INSULIN (HCC): ICD-10-CM

## 2024-06-13 DIAGNOSIS — Z12.5 SCREENING FOR PROSTATE CANCER: ICD-10-CM

## 2024-06-17 ENCOUNTER — OFFICE VISIT (OUTPATIENT)
Dept: AUDIOLOGY | Facility: CLINIC | Age: 64
End: 2024-06-17

## 2024-06-17 DIAGNOSIS — H90.3 SENSORY HEARING LOSS, BILATERAL: Primary | ICD-10-CM

## 2024-06-17 NOTE — PROGRESS NOTES
[facility] AUDIOLOGY HEARING [facility]ID CHECK    Sudeep Schmidt was seen today (6/17/2024) for a(n) in-warranty hearing aid check of his bilateral hearing aids. Today, Mr. Schmidt stated that he was doing well overall.       Device Information    Hearing Aid Fitting Date: 11/13/2023     Left Device Right Device   Hearing Aid Make: Oticon Oticon   Hearing Aid Model: REAL 1-R REAL 1-R   Serial Number: [id number]   Repair Warranty Expiration Date: 12/6/2026 12/6/2026   Loss/Damage Warranty Expiration Date:  12/6/2026 12/6/2026    Length: 3 3    Output: 85dB 85dB   Wax System: [employer]Me miniFIT   Dome Size/Style: 8mm open 8mm open   Battery: Lithium-ion Rechargeable Lithium-ion Rechargeable   Earmold Serial Number: N/A N/A   Earmold Warranty Date:  N/A N/A      Serial Number: [id number]  Accessories: N/A    Visual inspection of the device(s) revealed no noticeable damage or defects.     A listening check revealed good sound quality from the device(s).    The telecoil program was reviewed in preparation for an international trip with guided tours.    Changes to Device(s)    Firmware update installed in devices.  Devices cleaned, wax guards and domes replaced.     Recommendations & Follow-up    Hearing aids(s) are in good working condition. Mr. Schmidt stated that he has no further questions with his hearing aid(s) and does not feel that any other adjustments are needed at this time. He will follow-up in 3 mo., sooner if concerns arise. A new hearing test was recommended at that time.    It was a pleasure working with Mr. Schmidt today. He was encouraged to contact the clinic with any further questions in the meantime.    Alina Mares  Clinical Audiologist    [facility] PROFESSION[facility] AUDIOLOGY  [address] [facility] [address]

## 2024-07-02 ENCOUNTER — APPOINTMENT (OUTPATIENT)
Dept: RADIOLOGY | Facility: CLINIC | Age: 64
End: 2024-07-02
Attending: FAMILY MEDICINE
Payer: MEDICARE

## 2024-07-02 ENCOUNTER — OFFICE VISIT (OUTPATIENT)
Age: 64
End: 2024-07-02
Payer: MEDICARE

## 2024-07-02 ENCOUNTER — OFFICE VISIT (OUTPATIENT)
Dept: URGENT CARE | Facility: CLINIC | Age: 64
End: 2024-07-02
Payer: MEDICARE

## 2024-07-02 VITALS
HEART RATE: 83 BPM | DIASTOLIC BLOOD PRESSURE: 93 MMHG | WEIGHT: 235 LBS | BODY MASS INDEX: 31.14 KG/M2 | HEIGHT: 73 IN | SYSTOLIC BLOOD PRESSURE: 160 MMHG

## 2024-07-02 VITALS
BODY MASS INDEX: 31.14 KG/M2 | TEMPERATURE: 97.5 F | OXYGEN SATURATION: 97 % | RESPIRATION RATE: 16 BRPM | DIASTOLIC BLOOD PRESSURE: 86 MMHG | SYSTOLIC BLOOD PRESSURE: 142 MMHG | WEIGHT: 235 LBS | HEART RATE: 72 BPM | HEIGHT: 73 IN

## 2024-07-02 DIAGNOSIS — S92.531A: Primary | ICD-10-CM

## 2024-07-02 DIAGNOSIS — S92.531A: ICD-10-CM

## 2024-07-02 DIAGNOSIS — E11.9 TYPE 2 DIABETES MELLITUS WITHOUT COMPLICATION, WITHOUT LONG-TERM CURRENT USE OF INSULIN (HCC): Primary | ICD-10-CM

## 2024-07-02 PROCEDURE — 73660 X-RAY EXAM OF TOE(S): CPT

## 2024-07-02 PROCEDURE — 99203 OFFICE O/P NEW LOW 30 MIN: CPT | Performed by: STUDENT IN AN ORGANIZED HEALTH CARE EDUCATION/TRAINING PROGRAM

## 2024-07-02 PROCEDURE — 99203 OFFICE O/P NEW LOW 30 MIN: CPT | Performed by: FAMILY MEDICINE

## 2024-07-02 NOTE — PROGRESS NOTES
[facility]iatric [facility] Office Visit    ASSESSMENT     Diagnoses and all orders for this visit:    Type 2 diabetes mellitus without complication, without long-term current use of insulin (HCC)    Closed fracture of distal phalanx of lesser toe of right foot, physeal involvement unspecified, initial encounter  -     Ambulatory Referral to Podiatry         Problem List Items Addressed This Visit          Endocrine    Type 2 diabetes mellitus without complication, without long-term current use of insulin (HCC) - Primary       Musculoskeletal and Integument    Closed fracture of distal phalanx of lesser toe of right foot, physeal involvement unspecified, initial encounter     PLAN  -Patient and I discussed their foot in detail  -Continue rest, ice, elevation  -Tylenol/Motrin as needed for pain  -Buddy splinting suggested if patient desires  -Get and I discussed his bilateral feet in regard to his type 2 diabetes.  I did instruct him to wear supportive sneakers with over-the-counter inserts, white-colored socks so as to notice blood, self examine his feet daily, maintain tight glycemic control, and refrain from walking barefoot at all times.  -Most recent hemoglobin A1c from 6/19/2023 reviewed: 6.3%  -Return to clinic 1 year for diabetic foot examination    X-ray from 7/2/2024 of right third toe interpreted independently: Small dorsal avulsion fracture noted at the proximal phalanx.  No other acute osseous abnormality noted.    SUBJECTIVE    Chief Complaint:  Right foot pain     Patient ID: Sudeep Schmidt     7/2/2024: Sudeep is a pleasant 63-year-old male with a past medical history significant for hypertension, hyperlipidemia, type 2 diabetes.  He presents today for evaluation of his right foot.  He states that last night he banged his toe on a piece of furniture at home and states that the toe turned black and blue.  He went to an urgent care today where he was noted that he broke his right third  "digit.  He states that there is some mild to moderate pain especially with weightbearing.  He also has not yet had a diabetic foot examination performed this year and would like to begin annual examinations.        The following portions of the patient's history were reviewed and updated as appropriate: allergies, current medications, past family history, past medical history, past social history, past surgical history and problem list.    Review of Systems   Constitutional: Negative.    Respiratory: Negative.     Cardiovascular: Negative.    Gastrointestinal: Negative.    Genitourinary: Negative.    Musculoskeletal:  Positive for arthralgias and joint swelling.   Skin: Negative.          OBJECTIVE      /93   Pulse 83   Ht 6' 1\" (1.854 m)   Wt 107 kg (235 lb)   BMI 31.00 kg/m²        Physical Exam  Constitutional:       Appearance: Normal appearance.   HENT:      Head: Normocephalic and atraumatic.   Eyes:      General:         Right eye: No discharge.         Left eye: No discharge.   Cardiovascular:      Rate and Rhythm: Normal rate and regular rhythm.      Pulses: no weak pulses.           Dorsalis pedis pulses are 2+ on the right side and 2+ on the left side.        Posterior tibial pulses are 2+ on the right side and 2+ on the left side.   Pulmonary:      Effort: Pulmonary effort is normal.      Breath sounds: Normal breath sounds.   Feet:      Right foot:      Skin integrity: No ulcer, skin breakdown, erythema, warmth, callus or dry skin.      Left foot:      Skin integrity: No ulcer, skin breakdown, erythema, warmth, callus or dry skin.   Skin:     General: Skin is warm.      Capillary Refill: Capillary refill takes less than 2 seconds.   Neurological:      Mental Status: He is alert and oriented to person, place, and time.      Sensory: Sensation is intact. No sensory deficit.   Psychiatric:         Mood and Affect: Mood normal.         Vascular:  -DP and PT pulses intact b/l  -Capillary refill " time <2 sec b/l  -Skin temp: WNL    MSK:  -Pain on palpation to the right 3rd digit at the level of the middle phalanx/DIPJ  -No gross deformities noted  -MMT is 5/5 to all muscle compartments of the lower extremity  -Ankle dorsiflexion >10 degrees with knee extended and knee flexed b/l    Derm:  -No lesions, abrasions, or open wounds noted  -No noted interdigital maceration, peeling, malodor  -No callus formation noted on exam  -Edema, ecchymosis noted to the right 3rd digit distally    Patient's shoes and socks removed.    Right Foot/Ankle   Right Foot Inspection  Skin Exam: skin normal and skin intact. No dry skin, no warmth, no callus, no erythema, no maceration, no abnormal color, no pre-ulcer, no ulcer and no callus.     Toe Exam: ROM and strength within normal limits. No swelling, no tenderness, erythema and  no right toe deformity    Sensory   Monofilament testing: intact    Vascular  Capillary refills: < 3 seconds  The right DP pulse is 2+. The right PT pulse is 2+.     Right Toe  - Comprehensive Exam  Ecchymosis: none  Arch: normal  Hammertoes: absent  Claw Toes: absent  Swelling: none   Tenderness: none       Left Foot/Ankle  Left Foot Inspection  Skin Exam: skin normal and skin intact. No dry skin, no warmth, no erythema, no maceration, normal color, no pre-ulcer, no ulcer and no callus.     Toe Exam: ROM and strength within normal limits. No swelling, no tenderness, no erythema and no left toe deformity.     Sensory   Monofilament testing: intact    Vascular  Capillary refills: < 3 seconds  The left DP pulse is 2+. The left PT pulse is 2+.     Left Toe  - Comprehensive Exam  Ecchymosis: none  Arch: normal  Hammertoes: absent  Claw toes: absent  Swelling: none   Tenderness: none       Assign Risk Category  No deformity present  No loss of protective sensation  No weak pulses  Risk: 0

## 2024-07-02 NOTE — PATIENT INSTRUCTIONS
Xray images show a fracture of the distal phalanx of the right 3rd toe, awaiting official radiology report.   Patient placed in hard sole shoe and toes buddy taped for comfort and support.   Patient is to rest the foot, keep it elevated, and apply ice to the site.   Take Tylenol or Motrin as needed for pain.   Referral provided to Podiatry for further evaluation and treatment.   Follow up w/ PCP office for re-check in 3-5 days.

## 2024-07-02 NOTE — PROGRESS NOTES
[facility]        NAME: Sudeep Schmidt is a 63 y.o. male  : 1960    MRN: [id number]  DATE: 2024  TIME: 10:05 AM    Assessment and Plan   Closed fracture of distal phalanx of lesser toe of right foot, physeal involvement unspecified, initial encounter [S92.531A]  1. Closed fracture of distal phalanx of lesser toe of right foot, physeal involvement unspecified, initial encounter  XR toe right third min 2 views    XR toe right third min 2 views    Ambulatory Referral to Podiatry    Post Op Shoe        Patient Instructions     Patient Instructions   Xray images show a fracture of the distal phalanx of the right 3rd toe, awaiting official radiology report.   Patient placed in hard sole shoe and toes buddy taped for comfort and support.   Patient is to rest the foot, keep it elevated, and apply ice to the site.   Take Tylenol or Motrin as needed for pain.   Referral provided to Podiatry for further evaluation and treatment.   Follow up w/ PCP office for re-check in 3-5 days.    Follow up with PCP in 3-5 days.  Proceed to  ER if symptoms worsen.    If tests have been performed at [facility], our office will contact you with results if changes need to be made to the care plan discussed with you at the visit.  You can review your full results on [facility] MyChart.    Chief Complaint     Chief Complaint   Patient presents with    Foot Pain     Pt states he stubbed his right middle toe last night and now there is pain. No over the counter medications taken.          History of Present Illness     64 yo male presents for an injury of the R 3rd toe that occurred while at home last night. Patient states he woke up in the middle of the night and accidentally jammed his right 3rd toe against a hard surface, likely some furniture patient states. He is complaining of pain and swelling of the toe this morning. No bruising present. No wounds or bleeding. No injury to the nail. No numbness/tingling or weakness  of the foot. Patient experiences pain with walking and bearing weight on the toe. He has not attempted any treatment for the symptoms. No other injuries or complaints.      Review of Systems   Review of Systems   Constitutional: Negative.    Respiratory: Negative.     Cardiovascular: Negative.    Musculoskeletal:         As noted in HPI   Skin: Negative.    Allergic/Immunologic: Positive for food allergies.   Neurological: Negative.    Hematological: Negative.      Current Medications       Current Outpatient Medications:     amitriptyline (ELAVIL) 10 mg tablet, , Disp: , Rfl:     atorvastatin (LIPITOR) 10 mg tablet, TAKE 1 TABLET DAILY, Disp: 90 tablet, Rfl: 1    candesartan-hydrochlorothiazide (ATACAND HCT) 32-12.5 MG per tablet, Take 1 tablet by mouth daily, Disp: 90 tablet, Rfl: 3    Silodosin 8 MG CAPS, , Disp: , Rfl:     Current Allergies     Allergies as of 07/02/2024 - Reviewed 07/02/2024   Allergen Reaction Noted    Nuts - food allergy Blisters 02/25/2016            The following portions of the patient's history were reviewed and updated as appropriate: allergies, current medications, past family history, past medical history, past social history, past surgical history and problem list.     Past Medical History:   Diagnosis Date    Cholelithiasis     Hematuria     HL (hearing loss)     Hypertension     Renal calculi 02/09/2018    Renal colic     Tinnitus     Type 2 diabetes mellitus without complication, without long-term current use of insulin ([facility]) 06/20/2022       Past Surgical History:   Procedure Laterality Date    ARTHROTOMY KNEE      with Medial Meniscectomy    COLECTOMY  2009    partial    GALLBLADDER SURGERY      LITHOTRIPSY  2000    Renal    TONSILLECTOMY         Family History   Problem Relation Age of Onset    Hypertension Mother     Hyperlipidemia Mother     Hypothyroidism Mother     Cancer Mother         Lung Cancer    Stroke Mother     Skin cancer Father         forehead & Nose    Cancer  "Father         Lung Cancer, Skin Cancer    Other Paternal Grandmother         Nervous Breakdown    Alcohol abuse Paternal Grandfather     Alcohol abuse Other     Alcohol abuse Family     Skin cancer Paternal Uncle         forehead & Nose    Cancer Brother         Multiple Myeloma    Substance Abuse Neg Hx     Mental illness Neg Hx          Medications have been verified.        Objective   /86   Pulse 72   Temp 97.5 °F (36.4 °C)   Resp 16   Ht 6' 1\" (1.854 m)   Wt 107 kg (235 lb)   SpO2 97%   BMI 31.00 kg/m²   No LMP for male patient.       Physical Exam     Physical Exam  Vitals and nursing note reviewed.   Constitutional:       General: He is awake. He is not in acute distress.     Appearance: Normal appearance. He is well-developed and well-groomed. He is not ill-appearing, toxic-appearing or diaphoretic.   Cardiovascular:      Rate and Rhythm: Normal rate.      Pulses: Normal pulses.   Pulmonary:      Effort: Pulmonary effort is normal. No tachypnea, accessory muscle usage or respiratory distress.   Musculoskeletal:      Comments: Right 3rd toe: there is mild generalized swelling, no bruising or erythema present, skin is appropriately warm and intact, no wounds, nail intact, there is tenderness to palpation of the distal phalanx, good capillary refill, sensations intact.   Skin:     General: Skin is warm and dry.      Capillary Refill: Capillary refill takes less than 2 seconds.      Coloration: Skin is not pale.      Findings: No abrasion, bruising, ecchymosis, erythema, rash or wound.   Neurological:      Mental Status: He is alert and oriented to person, place, and time. Mental status is at baseline.      Sensory: Sensation is intact.   Psychiatric:         Mood and Affect: Mood normal.         Behavior: Behavior normal. Behavior is cooperative.         Thought Content: Thought content normal.         Judgment: Judgment normal.                   "

## 2024-07-03 LAB
BASOPHILS # BLD AUTO: 0.1 X10E3/UL (ref 0–0.2)
BASOPHILS NFR BLD AUTO: 1 %
EOSINOPHIL # BLD AUTO: 0.2 X10E3/UL (ref 0–0.4)
EOSINOPHIL NFR BLD AUTO: 4 %
ERYTHROCYTE [DISTWIDTH] IN BLOOD BY AUTOMATED COUNT: 13.6 % (ref 11.6–15.4)
EST. AVERAGE GLUCOSE BLD GHB EST-MCNC: 166 MG/DL
HBA1C MFR BLD: 7.4 % (ref 4.8–5.6)
HCT VFR BLD AUTO: 47 % (ref 37.5–51)
HGB BLD-MCNC: 15.7 G/DL (ref 13–17.7)
IMM GRANULOCYTES # BLD: 0 X10E3/UL (ref 0–0.1)
IMM GRANULOCYTES NFR BLD: 1 %
LYMPHOCYTES # BLD AUTO: 1.5 X10E3/UL (ref 0.7–3.1)
LYMPHOCYTES NFR BLD AUTO: 31 %
MCH RBC QN AUTO: 30.7 PG (ref 26.6–33)
MCHC RBC AUTO-ENTMCNC: 33.4 G/DL (ref 31.5–35.7)
MCV RBC AUTO: 92 FL (ref 79–97)
MONOCYTES # BLD AUTO: 0.6 X10E3/UL (ref 0.1–0.9)
MONOCYTES NFR BLD AUTO: 12 %
NEUTROPHILS # BLD AUTO: 2.6 X10E3/UL (ref 1.4–7)
NEUTROPHILS NFR BLD AUTO: 51 %
PLATELET # BLD AUTO: 175 X10E3/UL (ref 150–450)
RBC # BLD AUTO: 5.12 X10E6/UL (ref 4.14–5.8)
WBC # BLD AUTO: 5 X10E3/UL (ref 3.4–10.8)

## 2024-07-04 LAB
ALBUMIN SERPL-MCNC: 4.7 G/DL (ref 3.9–4.9)
ALBUMIN/CREAT UR: 21 MG/G CREAT (ref 0–29)
ALP SERPL-CCNC: 69 IU/L (ref 44–121)
ALT SERPL-CCNC: 18 IU/L (ref 0–44)
AST SERPL-CCNC: 18 IU/L (ref 0–40)
BILIRUB SERPL-MCNC: 1 MG/DL (ref 0–1.2)
BUN SERPL-MCNC: 9 MG/DL (ref 8–27)
BUN/CREAT SERPL: 9 (ref 10–24)
CALCIUM SERPL-MCNC: 9.3 MG/DL (ref 8.6–10.2)
CHLORIDE SERPL-SCNC: 100 MMOL/L (ref 96–106)
CHOLEST SERPL-MCNC: 179 MG/DL (ref 100–199)
CHOLEST/HDLC SERPL: 3.7 RATIO (ref 0–5)
CO2 SERPL-SCNC: 24 MMOL/L (ref 20–29)
CREAT SERPL-MCNC: 1.01 MG/DL (ref 0.76–1.27)
CREAT UR-MCNC: 165.4 MG/DL
EGFR: 83 ML/MIN/1.73
GLOBULIN SER-MCNC: 2.4 G/DL (ref 1.5–4.5)
GLUCOSE SERPL-MCNC: 129 MG/DL (ref 70–99)
HDLC SERPL-MCNC: 49 MG/DL
LDLC SERPL CALC-MCNC: 109 MG/DL (ref 0–99)
MICROALBUMIN UR-MCNC: 34.8 UG/ML
POTASSIUM SERPL-SCNC: 4.8 MMOL/L (ref 3.5–5.2)
PROT SERPL-MCNC: 7.1 G/DL (ref 6–8.5)
PSA SERPL-MCNC: 2.2 NG/ML (ref 0–4)
SL AMB REFLEX CRITERIA: NORMAL
SL AMB VLDL CHOLESTEROL CALC: 21 MG/DL (ref 5–40)
SODIUM SERPL-SCNC: 141 MMOL/L (ref 134–144)
TRIGL SERPL-MCNC: 120 MG/DL (ref 0–149)
TSH SERPL DL<=0.005 MIU/L-ACNC: 1.15 UIU/ML (ref 0.45–4.5)

## 2024-07-16 ENCOUNTER — RA CDI HCC (OUTPATIENT)
Dept: OTHER | Facility: HOSPITAL | Age: 64
End: 2024-07-16

## 2024-07-22 ENCOUNTER — OFFICE VISIT (OUTPATIENT)
Dept: FAMILY MEDICINE CLINIC | Facility: CLINIC | Age: 64
End: 2024-07-22
Payer: MEDICARE

## 2024-07-22 VITALS
HEART RATE: 86 BPM | BODY MASS INDEX: 30.48 KG/M2 | DIASTOLIC BLOOD PRESSURE: 88 MMHG | TEMPERATURE: 98 F | WEIGHT: 230 LBS | RESPIRATION RATE: 16 BRPM | HEIGHT: 73 IN | SYSTOLIC BLOOD PRESSURE: 130 MMHG | OXYGEN SATURATION: 98 %

## 2024-07-22 DIAGNOSIS — E66.09 CLASS 1 OBESITY DUE TO EXCESS CALORIES WITH SERIOUS COMORBIDITY AND BODY MASS INDEX (BMI) OF 30.0 TO 30.9 IN ADULT: ICD-10-CM

## 2024-07-22 DIAGNOSIS — I10 PRIMARY HYPERTENSION: ICD-10-CM

## 2024-07-22 DIAGNOSIS — R39.14 BENIGN PROSTATIC HYPERPLASIA WITH INCOMPLETE BLADDER EMPTYING: ICD-10-CM

## 2024-07-22 DIAGNOSIS — Z12.83 SCREENING FOR SKIN CANCER: ICD-10-CM

## 2024-07-22 DIAGNOSIS — E78.49 FAMILIAL HYPERLIPIDEMIA: ICD-10-CM

## 2024-07-22 DIAGNOSIS — Z90.49 S/P PARTIAL RESECTION OF COLON: ICD-10-CM

## 2024-07-22 DIAGNOSIS — E11.9 TYPE 2 DIABETES MELLITUS WITHOUT COMPLICATION, WITHOUT LONG-TERM CURRENT USE OF INSULIN (HCC): ICD-10-CM

## 2024-07-22 DIAGNOSIS — I44.60 FASCICULAR BLOCK: ICD-10-CM

## 2024-07-22 DIAGNOSIS — I10 ESSENTIAL HYPERTENSION: ICD-10-CM

## 2024-07-22 DIAGNOSIS — K40.90 INGUINAL HERNIA WITHOUT OBSTRUCTION OR GANGRENE, RECURRENCE NOT SPECIFIED, UNSPECIFIED LATERALITY: ICD-10-CM

## 2024-07-22 DIAGNOSIS — Z00.00 WELCOME TO MEDICARE PREVENTIVE VISIT: Primary | ICD-10-CM

## 2024-07-22 DIAGNOSIS — N40.0 ENLARGED PROSTATE: ICD-10-CM

## 2024-07-22 DIAGNOSIS — N40.1 BENIGN PROSTATIC HYPERPLASIA WITH INCOMPLETE BLADDER EMPTYING: ICD-10-CM

## 2024-07-22 DIAGNOSIS — R03.0 ELEVATED BLOOD PRESSURE READING: ICD-10-CM

## 2024-07-22 DIAGNOSIS — R94.31 LEFT AXIS DEVIATION: ICD-10-CM

## 2024-07-22 DIAGNOSIS — M79.2 NEURALGIA: ICD-10-CM

## 2024-07-22 PROCEDURE — G0402 INITIAL PREVENTIVE EXAM: HCPCS | Performed by: NURSE PRACTITIONER

## 2024-07-22 PROCEDURE — 99214 OFFICE O/P EST MOD 30 MIN: CPT | Performed by: NURSE PRACTITIONER

## 2024-07-22 PROCEDURE — G0403 EKG FOR INITIAL PREVENT EXAM: HCPCS | Performed by: NURSE PRACTITIONER

## 2024-07-22 RX ORDER — ATORVASTATIN CALCIUM 10 MG/1
10 TABLET, FILM COATED ORAL DAILY
Qty: 90 TABLET | Refills: 3 | Status: SHIPPED | OUTPATIENT
Start: 2024-07-22

## 2024-07-22 RX ORDER — BLOOD SUGAR DIAGNOSTIC
STRIP MISCELLANEOUS
Qty: 300 EACH | Refills: 3 | Status: SHIPPED | OUTPATIENT
Start: 2024-07-22

## 2024-07-22 RX ORDER — LANCETS 33 GAUGE
EACH MISCELLANEOUS
Qty: 300 EACH | Refills: 3 | Status: SHIPPED | OUTPATIENT
Start: 2024-07-22

## 2024-07-22 RX ORDER — BLOOD-GLUCOSE METER
KIT MISCELLANEOUS
Qty: 1 KIT | Refills: 0 | Status: SHIPPED | OUTPATIENT
Start: 2024-07-22

## 2024-07-22 RX ORDER — CANDESARTAN CILEXETIL AND HYDROCHLOROTHIAZIDE 32; 12.5 MG/1; MG/1
1 TABLET ORAL DAILY
Qty: 90 TABLET | Refills: 3 | Status: SHIPPED | OUTPATIENT
Start: 2024-07-22

## 2024-07-22 NOTE — PROGRESS NOTES
Ambulatory Visit  Name: Sudeep Schmidt      : 1960      MRN: [id number]  Encounter Provider: ISABEL Ramirez  Encounter Date: 2024   Encounter department: [facility]    Assessment & Plan   1. Welcome to Medicare preventive visit  Comments:  Age appropriate recommendations discussed. Fasting labs reviewed.  2. Type 2 diabetes mellitus without complication, without long-term current use of insulin (HCC)  Assessment & Plan:  Increase in A1c, previously diet controlled.  Advise restart of metformin, pt is in agreement with plan.  Encourage diabetic diet and exercise as tolerated.  Recheck 3 months.    Lab Results   Component Value Date    HGBA1C 7.4 (H) 2024     Orders:  -     Blood Glucose Monitoring Suppl (OneTouch Verio Reflect) w/Device KIT; Check blood sugars three times daily. Please substitute with appropriate alternative as covered by patient's insurance. Dx: E11.65  -     glucose blood (OneTouch Verio) test strip; Check blood sugars three times daily. Please substitute with appropriate alternative as covered by patient's insurance. Dx: E11.65  -     OneTouch Delica Lancets 33G MISC; Check blood sugars three times daily. Please substitute with appropriate alternative as covered by patient's insurance. Dx: E11.65  -     metFORMIN (GLUCOPHAGE) 500 mg tablet; Take 1 tablet (500 mg total) by mouth 2 (two) times a day with meals  -     POCT ECG  3. Primary hypertension  Assessment & Plan:  Stress test from  reviewed.  ECG updated in office today.  Stable, no issues at this time.   Orders:  -     POCT ECG  4. Benign prostatic hyperplasia with incomplete bladder emptying  Assessment & Plan:  Following up with urology, Dr. Arzola.  Stable, no changes.   5. Neuralgia  Assessment & Plan:  Stable, no issues.   6. Class 1 obesity due to excess calories with serious comorbidity and body mass index (BMI) of 30.0 to 30.9 in adult  Assessment & Plan:  Encourage regular  exercise as tolerated.  Advise weight loss.   7. Familial hyperlipidemia  -     atorvastatin (LIPITOR) 10 mg tablet; Take 1 tablet (10 mg total) by mouth daily  8. Enlarged prostate  Assessment & Plan:  Following up with cj Donnelly.   9. Screening for skin cancer  -     Ambulatory Referral to Dermatology; Future  10. S/P partial resection of colon  11. Essential hypertension  -     candesartan-hydrochlorothiazide (ATACAND HCT) 32-12.5 MG per tablet; Take 1 tablet by mouth daily  12. Elevated blood pressure reading  -     POCT ECG  13. Left axis deviation  -     Echo complete w/ contrast if indicated; Future; Expected date: 07/22/2024  14. Fascicular block  -     Echo complete w/ contrast if indicated; Future; Expected date: 07/22/2024  15. Inguinal hernia without obstruction or gangrene, recurrence not specified, unspecified laterality  -     Ambulatory Referral to General Surgery; Future       Preventive health issues were discussed with patient, and age appropriate screening tests were ordered as noted in patient's After Visit Summary. Personalized health advice and appropriate referrals for health education or preventive services given if needed, as noted in patient's After Visit Summary.    History of Present Illness     Get is a 64 year old male with BPH, hypertension, neuralgia, pernicious anemia, obesity, hyperlipidemia, hypertension, DM II, history of bariatric surgery and hearing loss, who presents to the office for annual medicare wellness. Denies fever, chills, chest pain, shortness of breath, n/v/d.        Patient Care Team:  ISABEL Ramirez as PCP - General (Family Medicine)  MD Gunnar Hill MD (Urology)    Review of Systems   Constitutional:  Negative for diaphoresis, fatigue and fever.   HENT:  Negative for ear pain and hearing loss.    Eyes:  Negative for pain and visual disturbance.   Respiratory:  Negative for chest tightness and shortness of breath.     Cardiovascular:  Negative for chest pain, palpitations and leg swelling.   Gastrointestinal:  Negative for abdominal pain, constipation and diarrhea.   Genitourinary:  Negative for difficulty urinating.   Musculoskeletal:  Negative for arthralgias and myalgias.        Intermittent and mild, left inguinal pain, hernia   Skin:  Negative for rash.   Neurological:  Negative for dizziness, numbness and headaches.   Psychiatric/Behavioral:  Negative for sleep disturbance.      Medical History Reviewed by provider this encounter:  Tobacco  Allergies  Meds  Problems  Med Hx  Surg Hx  Fam Hx       Annual Wellness Visit Questionnaire   Sudeep is here for his Subsequent Wellness visit. Last Medicare Wellness visit information reviewed, patient interviewed and updates made to the record today.      Health Risk Assessment:   Patient rates overall health as good. Patient feels that their physical health rating is same. Patient is satisfied with their life. Eyesight was rated as same. Hearing was rated as same. Patient feels that their emotional and mental health rating is same. Patients states they are never, rarely angry. Patient states they are never, rarely unusually tired/fatigued. Pain experienced in the last 7 days has been some. Patient's pain rating has been 2/10. Patient states that he has experienced no weight loss or gain in last 6 months.     Depression Screening:   PHQ-2 Score: 0      Fall Risk Screening:   In the past year, patient has experienced: no history of falling in past year      Home Safety:  Patient does not have trouble with stairs inside or outside of their home. Patient has working smoke alarms and has working carbon monoxide detector. Home safety hazards include: none.     Nutrition:   Current diet is Regular.     Medications:   Patient is currently taking over-the-counter supplements. OTC medications include: see medication list. Patient is able to manage medications.     Activities of Daily  Living (ADLs)/Instrumental Activities of Daily Living (IADLs):   Walk and transfer into and out of bed and chair?: Yes  Dress and groom yourself?: Yes    Bathe or shower yourself?: Yes    Feed yourself? Yes  Do your laundry/housekeeping?: Yes  Manage your money, pay your bills and track your expenses?: Yes  Make your own meals?: Yes    Do your own shopping?: Yes    Durable Medical Equipment Suppliers  NA    Previous Hospitalizations:   Any hospitalizations or ED visits within the last 12 months?: No      Hospitalization Comments: Urgent Care for broken toe    Advance Care Planning:   Living will: Yes    Durable POA for healthcare: No    Advanced directive: Yes    Advanced directive counseling given: Yes    ACP document given: Yes    End of Life Decisions reviewed with patient: Yes    Provider agrees with end of life decisions: Yes      Cognitive Screening:   Provider or family/friend/caregiver concerned regarding cognition?: No    PREVENTIVE SCREENINGS      Cardiovascular Screening:    General: Screening Not Indicated and History Lipid Disorder      Diabetes Screening:     General: Screening Not Indicated and History Diabetes      Colorectal Cancer Screening:     General: Screening Current      Prostate Cancer Screening:    General: Screening Current      Abdominal Aortic Aneurysm (AAA) Screening:    Risk factors include: age between 65-74 yo        Lung Cancer Screening:     General: Screening Not Indicated      Hepatitis C Screening:    General: Screening Current    Screening, Brief Intervention, and Referral to Treatment (SBIRT)    Screening  Typical number of drinks in a day: 2  Typical number of drinks in a week: 14  Interpretation: Low risk drinking behavior.    AUDIT-C Screenin) How often did you have a drink containing alcohol in the past year? 4 or more times a week  2) How many drinks did you have on a typical day when you were drinking in the past year? 1 to 2  3) How often did you have 6 or more  drinks on one occasion in the past year? never    AUDIT-C Score: 4  Interpretation: Score 4-12 (male): POSITIVE screen for alcohol misuse    AUDIT Screenin) How often during the last year have you found that you were not able to stop drinking once you had started? 0 - never  5) How often during the last year have you failed to do what was normally expected from you because of drinking? 0 - never  6) How often during the last year have you needed a first drink in the morning to get yourself going after a heavy drinking session? 0 - never  7) How often during the last year have you had a feeling of guilt or remorse after drinking? 0 - never  8) How often during the last year have you been unable to remember what happened the night before because you had been drinking? 0 - never  9) Have you or someone else been injured as a result of your drinking? 0 - no  10) Has a relative or friend or a doctor or another health worker been concerned about your drinking or suggested you cut down? 0 - no    AUDIT Score: 4  Interpretation: Low risk alcohol consumption    Single Item Drug Screening:  How often have you used an illegal drug (including marijuana) or a prescription medication for non-medical reasons in the past year? never    Single Item Drug Screen Score: 0  Interpretation: Negative screen for possible drug use disorder    Other Counseling Topics:   Car/seat belt/driving safety, skin self-exam, sunscreen and calcium and vitamin D intake and regular weightbearing exercise.     Social Determinants of Health     Food Insecurity: No Food Insecurity (2024)    Hunger Vital Sign     Worried About Running Out of Food in the Last Year: Never true     Ran Out of Food in the Last Year: Never true   Transportation Needs: No Transportation Needs (2024)    PRAPARE - Transportation     Lack of Transportation (Medical): No     Lack of Transportation (Non-Medical): No   Housing Stability: Low Risk  (2024)    Housing  "Stability Vital Sign     Unable to Pay for Housing in the Last Year: No     Number of Times Moved in the Last Year: 0     Homeless in the Last Year: No   Utilities: Not At Risk (7/16/2024)    [facility] Utilities     Threatened with loss of utilities: No     Vision Screening    Right eye Left eye Both eyes   Without correction      With correction 20/25 20/20 20/20       Objective     /88   Pulse 86   Temp 98 °F (36.7 °C) (Temporal)   Resp 16   Ht 6' 0.5\" (1.842 m)   Wt 104 kg (230 lb)   SpO2 98%   BMI 30.76 kg/m²     Physical Exam  Vitals reviewed.   Constitutional:       General: He is not in acute distress.     Appearance: Normal appearance. He is well-developed.   HENT:      Head: Normocephalic and atraumatic.      Right Ear: Tympanic membrane, ear canal and external ear normal.      Left Ear: Tympanic membrane, ear canal and external ear normal.   Eyes:      General: Lids are normal.      Extraocular Movements: Extraocular movements intact.      Conjunctiva/sclera: Conjunctivae normal.      Pupils: Pupils are equal, round, and reactive to light.      Funduscopic exam:     Right eye: Red reflex present.         Left eye: Red reflex present.  Cardiovascular:      Rate and Rhythm: Normal rate and regular rhythm.      Pulses: Normal pulses.      Heart sounds: Normal heart sounds, S1 normal and S2 normal. No murmur heard.  Pulmonary:      Effort: Pulmonary effort is normal.      Breath sounds: Normal breath sounds.   Abdominal:      Palpations: Abdomen is soft.      Tenderness: There is no abdominal tenderness.   Musculoskeletal:         General: No swelling.      Cervical back: Full passive range of motion without pain, normal range of motion and neck supple.      Right lower leg: No edema.      Left lower leg: No edema.   Skin:     General: Skin is warm and dry.      Capillary Refill: Capillary refill takes less than 2 seconds.   Neurological:      General: No focal deficit present.      Mental Status: He is " alert.      Cranial Nerves: Cranial nerves 2-12 are intact.      Deep Tendon Reflexes: Reflexes are normal and symmetric.   Psychiatric:         Mood and Affect: Mood normal.       Administrative Statements   I have spent a total time of 40 minutes in caring for this patient on the day of the visit/encounter including Instructions for management, Patient and family education, Importance of tx compliance, Risk factor reductions, Impressions, Counseling / Coordination of care, Documenting in the medical record, and Reviewing / ordering tests, medicine, procedures  .

## 2024-07-22 NOTE — PATIENT INSTRUCTIONS
Medicare Preventive Visit Patient Instructions  Thank you for completing your Welcome to Medicare Visit or Medicare Annual Wellness Visit today. Your next wellness visit will be due in one year (7/23/2025).  The screening/preventive services that you may require over the next 5-10 years are detailed below. Some tests may not apply to you based off risk factors and/or age. Screening tests ordered at today's visit but not completed yet may show as past due. Also, please note that scanned in results may not display below.  Preventive Screenings:  Service Recommendations Previous Testing/Comments   Colorectal Cancer Screening  Colonoscopy    Fecal Occult Blood Test (FOBT)/Fecal Immunochemical Test (FIT)  Fecal DNA/Cologuard Test  Flexible Sigmoidoscopy Age: 45-75 years old   Colonoscopy: every 10 years (May be performed more frequently if at higher risk)  OR  FOBT/FIT: every 1 year  OR  Cologuard: every 3 years  OR  Sigmoidoscopy: every 5 years  Screening may be recommended earlier than age 45 if at higher risk for colorectal cancer. Also, an individualized decision between you and your healthcare provider will decide whether screening between the ages of 76-85 would be appropriate. Colonoscopy: 07/08/2021  FOBT/FIT: Not on file  Cologuard: Not on file  Sigmoidoscopy: Not on file    Screening Current     Prostate Cancer Screening Individualized decision between patient and health care provider in men between ages of 55-69   Medicare will cover every 12 months beginning on the day after your 50th birthday PSA: 2.2 ng/mL     Screening Current     Hepatitis C Screening Once for adults born between 1945 and 1965  More frequently in patients at high risk for Hepatitis C Hep C Antibody: 06/10/2020    Screening Current   Diabetes Screening 1-2 times per year if you're at risk for diabetes or have pre-diabetes Fasting glucose: No results in last 5 years (No results in last 5 years)  A1C: 7.4 % (7/3/2024)  Screening Not  Indicated  History Diabetes   Cholesterol Screening Once every 5 years if you don't have a lipid disorder. May order more often based on risk factors. Lipid panel: 07/03/2024  Screening Not Indicated  History Lipid Disorder      Other Preventive Screenings Covered by Medicare:  Abdominal Aortic Aneurysm (AAA) Screening: covered once if your at risk. You're considered to be at risk if you have a family history of AAA or a male between the age of 65-75 who smoking at least 100 cigarettes in your lifetime.  Lung Cancer Screening: covers low dose CT scan once per year if you meet all of the following conditions: (1) Age 55-77; (2) No signs or symptoms of lung cancer; (3) Current smoker or have quit smoking within the last 15 years; (4) You have a tobacco smoking history of at least 20 pack years (packs per day x number of years you smoked); (5) You get a written order from a healthcare provider.  Glaucoma Screening: covered annually if you're considered high risk: (1) You have diabetes OR (2) Family history of glaucoma OR (3)  aged 50 and older OR (4)  American aged 65 and older  Osteoporosis Screening: covered every 2 years if you meet one of the following conditions: (1) Have a vertebral abnormality; (2) On glucocorticoid therapy for more than 3 months; (3) Have primary hyperparathyroidism; (4) On osteoporosis medications and need to assess response to drug therapy.  HIV Screening: covered annually if you're between the age of 15-65. Also covered annually if you are younger than 15 and older than 65 with risk factors for HIV infection. For pregnant patients, it is covered up to 3 times per pregnancy.    Immunizations:  Immunization Recommendations   Influenza Vaccine Annual influenza vaccination during flu season is recommended for all persons aged >= 6 months who do not have contraindications   Pneumococcal Vaccine   * Pneumococcal conjugate vaccine = PCV13 (Prevnar 13), PCV15 (Vaxneuvance),  PCV20 (Prevnar 20)  * Pneumococcal polysaccharide vaccine = PPSV23 (Pneumovax) Adults 19-65 yo with certain risk factors or if 65+ yo  If never received any pneumonia vaccine: recommend Prevnar 20 (PCV20)  Give PCV20 if previously received 1 dose of PCV13 or PPSV23   Hepatitis B Vaccine 3 dose series if at intermediate or high risk (ex: diabetes, end stage renal disease, liver disease)   Respiratory syncytial virus (RSV) Vaccine - COVERED BY MEDICARE PART D  * RSVPreF3 (Arexvy) CDC recommends that adults 60 years of age and older may receive a single dose of RSV vaccine using shared clinical decision-making (SCDM)   Tetanus (Td) Vaccine - COST NOT COVERED BY MEDICARE PART B Following completion of primary series, a booster dose should be given every 10 years to maintain immunity against tetanus. Td may also be given as tetanus wound prophylaxis.   Tdap Vaccine - COST NOT COVERED BY MEDICARE PART B Recommended at least once for all adults. For pregnant patients, recommended with each pregnancy.   Shingles Vaccine (Shingrix) - COST NOT COVERED BY MEDICARE PART B  2 shot series recommended in those 19 years and older who have or will have weakened immune systems or those 50 years and older     Health Maintenance Due:      Topic Date Due   • Colorectal Cancer Screening  07/08/2026   • HIV Screening  Completed   • Hepatitis C Screening  Completed     Immunizations Due:      Topic Date Due   • COVID-19 Vaccine (6 - 2023-24 season) 09/01/2023   • Influenza Vaccine (1) 09/01/2024     Advance Directives   What are advance directives?  Advance directives are legal documents that state your wishes and plans for medical care. These plans are made ahead of time in case you lose your ability to make decisions for yourself. Advance directives can apply to any medical decision, such as the treatments you want, and if you want to donate organs.   What are the types of advance directives?  There are many types of advance directives,  and each state has rules about how to use them. You may choose a combination of any of the following:  Living will:  This is a written record of the treatment you want. You can also choose which treatments you do not want, which to limit, and which to stop at a certain time. This includes surgery, medicine, IV fluid, and tube feedings.   Durable power of  for healthcare (DPAHC):  This is a written record that states who you want to make healthcare choices for you when you are unable to make them for yourself. This person, called a proxy, is usually a family member or a friend. You may choose more than 1 proxy.  Do not resuscitate (DNR) order:  A DNR order is used in case your heart stops beating or you stop breathing. It is a request not to have certain forms of treatment, such as CPR. A DNR order may be included in other types of advance directives.  Medical directive:  This covers the care that you want if you are in a coma, near death, or unable to make decisions for yourself. You can list the treatments you want for each condition. Treatment may include pain medicine, surgery, blood transfusions, dialysis, IV or tube feedings, and a ventilator (breathing machine).  Values history:  This document has questions about your views, beliefs, and how you feel and think about life. This information can help others choose the care that you would choose.  Why are advance directives important?  An advance directive helps you control your care. Although spoken wishes may be used, it is better to have your wishes written down. Spoken wishes can be misunderstood, or not followed. Treatments may be given even if you do not want them. An advance directive may make it easier for your family to make difficult choices about your care.   Weight Management   Why it is important to manage your weight:  Being overweight increases your risk of health conditions such as heart disease, high blood pressure, type 2 diabetes, and  certain types of cancer. It can also increase your risk for osteoarthritis, sleep apnea, and other respiratory problems. Aim for a slow, steady weight loss. Even a small amount of weight loss can lower your risk of health problems.  How to lose weight safely:  A safe and healthy way to lose weight is to eat fewer calories and get regular exercise. You can lose up about 1 pound a week by decreasing the number of calories you eat by 500 calories each day.   Healthy meal plan for weight management:  A healthy meal plan includes a variety of foods, contains fewer calories, and helps you stay healthy. A healthy meal plan includes the following:  Eat whole-grain foods more often.  A healthy meal plan should contain fiber. Fiber is the part of grains, fruits, and vegetables that is not broken down by your body. Whole-grain foods are healthy and provide extra fiber in your diet. Some examples of whole-grain foods are whole-wheat breads and pastas, oatmeal, brown rice, and bulgur.  Eat a variety of vegetables every day.  Include dark, leafy greens such as spinach, kale, terrence greens, and mustard greens. Eat yellow and orange vegetables such as carrots, sweet potatoes, and winter squash.   Eat a variety of fruits every day.  Choose fresh or canned fruit (canned in its own juice or light syrup) instead of juice. Fruit juice has very little or no fiber.  Eat low-fat dairy foods.  Drink fat-free (skim) milk or 1% milk. Eat fat-free yogurt and low-fat cottage cheese. Try low-fat cheeses such as mozzarella and other reduced-fat cheeses.  Choose meat and other protein foods that are low in fat.  Choose beans or other legumes such as split peas or lentils. Choose fish, skinless poultry (chicken or turkey), or lean cuts of red meat (beef or pork). Before you cook meat or poultry, cut off any visible fat.   Use less fat and oil.  Try baking foods instead of frying them. Add less fat, such as margarine, sour cream, regular salad  "dressing and mayonnaise to foods. Eat fewer high-fat foods. Some examples of high-fat foods include french fries, doughnuts, ice cream, and cakes.  Eat fewer sweets.  Limit foods and drinks that are high in sugar. This includes candy, cookies, regular soda, and sweetened drinks.  Exercise:  Exercise at least 30 minutes per day on most days of the week. Some examples of exercise include walking, biking, dancing, and swimming. You can also fit in more physical activity by taking the stairs instead of the elevator or parking farther away from stores. Ask your healthcare provider about the best exercise plan for you.   Alcohol Use and Your Health    Drinking too much can harm your health.  Excessive alcohol use leads to about 88,000 death in the [place] each year, and shortens the life of those who diet by almost 30 years.  Further, excessive drinking cost the economy $249 billion in 2010.  Most excessive drinkers are not alcohol dependent.    Excessive alcohol use has immediate effects that increase the risk of many harmful health conditions.  These are most often the result of binge drinking.  Over time, excessive alcohol use can lead to the development of chronic diseases and other series health problems.    What is considered a \"drink\"?        Excessive alcohol use includes:  Binge Drinking: For women, 4 or more drinks consumed on one occasion. For men, 5 or more drinks consumed on one occasion.  Heavy Drinking: For women, 8 or more drinks per week. For men, 15 or more drinks per week  Any alcohol used by pregnant women  Any alcohol used by those under the age of 21 years    If you choose to drink, do so in moderation:  Do not drink at all if you are under the age of 21, or if you are or may be pregnant, or have health problems that could be made worse by drinking.  For women, up to 1 drink per day  For men, up to 2 drinks a day    No one should begin drinking or drink more frequently based on potential health " benefits    Short-Term Health Risks:  Injuries: motor vehicle crashes, falls, drownings, burns  Violence: homicide, suicide, sexual assault, intimate partner violence  Alcohol poisoning  Reproductive health: risky sexual behaviors, unintended prengnacy, sexually transmitted diseases, miscarriage, stillbirth, fetal alcohol syndrome    Long-Term Health Risks:  Chronic diseases: high blood pressure, heart disease, stroke, liver disease, digestive problems  Cancers: breast, mouth and throat, liver, colon  Learning and memory problems: dementia, poor school performance  Mental health: depression, anxiety, insomnia  Social problems: lost productivity, family problems, unemployment  Alcohol dependence    For support and more information:  Substance Abuse and Mental Health Services Administration  [address]  [place] [address]  Web Address: http://www.samhsa.gov    Alcoholics Anonymous        Web Address: http://www.aa.org    https://www.cdc.gov/alcohol/fact-sheets/alcohol-use.htm     © Copyright [employer] 2018 Information is for End User's use only and may not be sold, redistributed or otherwise used for commercial purposes. All illustrations and images included in CareNotes® are the copyrighted property of A.D.A.M., Inc. or [employer]

## 2024-07-22 NOTE — ASSESSMENT & PLAN NOTE
Increase in A1c, previously diet controlled.  Advise restart of metformin, pt is in agreement with plan.  Encourage diabetic diet and exercise as tolerated.  Recheck 3 months.    Lab Results   Component Value Date    HGBA1C 7.4 (H) 07/03/2024

## 2024-08-06 ENCOUNTER — HOSPITAL ENCOUNTER (OUTPATIENT)
Dept: NON INVASIVE DIAGNOSTICS | Facility: HOSPITAL | Age: 64
Discharge: HOME | End: 2024-08-06
Payer: MEDICARE

## 2024-08-06 VITALS
HEIGHT: 72 IN | BODY MASS INDEX: 31.15 KG/M2 | HEART RATE: 86 BPM | WEIGHT: 230 LBS | DIASTOLIC BLOOD PRESSURE: 88 MMHG | SYSTOLIC BLOOD PRESSURE: 130 MMHG

## 2024-08-06 DIAGNOSIS — I44.60 FASCICULAR BLOCK: ICD-10-CM

## 2024-08-06 DIAGNOSIS — R94.31 LEFT AXIS DEVIATION: ICD-10-CM

## 2024-08-06 LAB
AORTIC ROOT: 3 CM
APICAL FOUR CHAMBER EJECTION FRACTION: 65 %
AV LVOT PEAK GRADIENT: 6 MMHG
AV PEAK GRADIENT: 12 MMHG
BSA FOR ECHO PROCEDURE: 2.26 M2
DOP CALC LVOT AREA: 3.8 CM2
DOP CALC LVOT DIAMETER: 2.2 CM
E WAVE DECELERATION TIME: 189 MS
E/A RATIO: 0.9
FRACTIONAL SHORTENING: 32 (ref 28–44)
INTERVENTRICULAR SEPTUM IN DIASTOLE (PARASTERNAL SHORT AXIS VIEW): 1.2 CM
INTERVENTRICULAR SEPTUM: 1.2 CM (ref 0.6–1.1)
LAAS-AP2: 20.7 CM2
LAAS-AP4: 21.4 CM2
LEFT ATRIUM SIZE: 3.8 CM
LEFT ATRIUM VOLUME (MOD BIPLANE): 62 ML
LEFT ATRIUM VOLUME INDEX (MOD BIPLANE): 27.3 ML/M2
LEFT INTERNAL DIMENSION IN SYSTOLE: 3 CM (ref 2.1–4)
LEFT VENTRICULAR INTERNAL DIMENSION IN DIASTOLE: 4.4 CM (ref 3.5–6)
LEFT VENTRICULAR POSTERIOR WALL IN END DIASTOLE: 0.8 CM
LEFT VENTRICULAR STROKE VOLUME: 50 ML
LVSV (TEICH): 50 ML
MV E'TISSUE VEL-SEP: 9 CM/S
MV PEAK A VEL: 0.99 M/S
MV PEAK E VEL: 89 CM/S
MV STENOSIS PRESSURE HALF TIME: 55 MS
MV VALVE AREA P 1/2 METHOD: 4
RIGHT ATRIUM AREA SYSTOLE A4C: 13.4 CM2
RIGHT VENTRICLE ID DIMENSION: 3.5 CM
SL CV LEFT ATRIUM LENGTH A2C: 5.4 CM
SL CV LV EF: 59
SL CV PED ECHO LEFT VENTRICLE DIASTOLIC VOLUME (MOD BIPLANE) 2D: 86 ML
SL CV PED ECHO LEFT VENTRICLE SYSTOLIC VOLUME (MOD BIPLANE) 2D: 36 ML
TRICUSPID ANNULAR PLANE SYSTOLIC EXCURSION: 1.6 CM

## 2024-08-06 PROCEDURE — 93306 TTE W/DOPPLER COMPLETE: CPT | Performed by: INTERNAL MEDICINE

## 2024-08-06 PROCEDURE — 93306 TTE W/DOPPLER COMPLETE: CPT

## 2024-08-19 ENCOUNTER — TELEPHONE (OUTPATIENT)
Age: 64
End: 2024-08-19

## 2024-08-19 NOTE — TELEPHONE ENCOUNTER
Patient returned call from last week.  Read message from Jania regarding OK Echo, if he needed more info we could schedule a virtual.  However patient was fine with Jania's assessment and he has F/U in two months, he will discuss with her then.  No other questions or issues at this time.

## 2024-09-05 NOTE — ASSESSMENT & PLAN NOTE
Health Maintenance       DTaP/Tdap/Td Vaccine (1 - Tdap)  Never done    Shingles Vaccine (1 of 2)  Never done    COVID-19 Vaccine (9 - 2023-24 season)  Overdue since 9/1/2024    Traditional Medicare- Medicare Wellness Visit (Yearly)  Due since 9/1/2024    Influenza Vaccine (1)  Due since 9/1/2024           Following review of the above:  Patient wishes to discuss with clinician: COVID-19, Dtap/Tdap/Td, Influenza, and Shingles    Note: Refer to final orders and clinician documentation.        Pt doing ok overall.   Recheck 3 months     Lab Results   Component Value Date    HGBA1C 6.3 (H) 06/19/2023

## 2024-09-16 ENCOUNTER — OFFICE VISIT (OUTPATIENT)
Dept: AUDIOLOGY | Facility: CLINIC | Age: 64
End: 2024-09-16
Payer: MEDICARE

## 2024-09-16 DIAGNOSIS — H90.3 SENSORINEURAL HEARING LOSS, BILATERAL: Primary | ICD-10-CM

## 2024-09-16 PROCEDURE — 92567 TYMPANOMETRY: CPT | Performed by: AUDIOLOGIST

## 2024-09-16 PROCEDURE — 92557 COMPREHENSIVE HEARING TEST: CPT | Performed by: AUDIOLOGIST

## 2024-09-16 NOTE — PROGRESS NOTES
Diagnostic Hearing Evaluation    Name:  Sudeep Schmidt  :  1960  Age:  64 y.o.   MRN:  [id number]  Date of Evaluation: 24     HISTORY:     Reason for visit: Annual Hearing Test    Sudeep Schmidt is being seen today at the request of Dr. Resendez for an annual evaluation of hearing. The patient reports no significant changes in hearing or medical history since previous evaluation.     EVALUATION:    Otoscopic Evaluation:   Right Ear: Unremarkable, canal clear   Left Ear: Unremarkable, canal clear    Tympanometry:   Right Ear: Type A; normal middle ear pressure and static compliance    Left Ear: Type A; normal middle ear pressure and static compliance     Speech Audiometry:  Speech Reception (SRT)    Right Ear: 5 dB HL    Left Ear: 10 dB HL    Word Recognition Scores (WRS):  Right Ear: excellent (100 % correct)     Left Ear: excellent (100 % correct)    Stimuli: [id number]    Pure Tone Audiometry:  Conventional pure tone audiometry from 250 - 8000 Hz  was obtained with good reliability and revealed the following:     Right Ear: Normal sloping to moderately severe sensorineural hearing loss (SNHL)    Left Ear: Normal sloping to moderately severe sensorineural hearing loss (SNHL)     *see attached audiogram    IMPRESSIONS:   Stable hearing thresholds since previous evaluation on 10/24/23    RECOMMENDATIONS:  Annual hearing eval    PATIENT EDUCATION:   The results of today's results and recommendations were reviewed with the patient and his hearing thresholds were explained at length. Treatment options, including amplification and communication strategies, were discussed as appropriate. The patient voiced understanding of his test results. Questions were addressed and the patient was encouraged to contact our department should concerns arise.      Izzy Looney, CCC-A  Clinical Audiologist  [facility] AUDIOLOGY  [address]  [phone]

## 2024-09-16 NOTE — PROGRESS NOTES
[facility] AUDIOLOGY HEARING AID CHECK    Sudeep Schmidt was seen today (9/16/2024) for a(n) in-warranty hearing aid check of his bilateral hearing aids. Today, Mr. Schmidt stated that he was doing well overall, but feels that the hearing aids might be over amplifying the higher frequencies. He notes that he finds his wife difficult to hear.       Device Information    Hearing Aid Fitting Date: 11/13/2023     Left Device Right Device   Hearing Aid Make: OtTapFwd Oticon   Hearing Aid Model: REAL 1-R REAL 1-R   Serial Number: [id number]   Repair Warranty Expiration Date: 12/6/2026 12/6/2026   Loss/Damage Warranty Expiration Date:  12/6/2026 12/6/2026    Length: 3 3    Output: 85dB 85dB   Wax System: OptoNova miniFIT   Dome Size/Style: 8mm open 8mm open   Battery: Lithium-ion Rechargeable Lithium-ion Rechargeable   [employer] Serial Number: N/A N/A   [employer] Warranty Date:  N/A N/A      Serial Number: [id number]  Accessories: N/A    Visual inspection of the device(s) revealed no noticeable damage or defects.     A listening check revealed good sound quality from the device(s).      Changes to Device(s)    Devices cleaned, wax guards and domes replaced.   Devices placed in ReDux dryer and 0.8 uL of moisture removed.   Programming changes to improve sound quality and attempt to make wife's voice more audible. Decreased ultra highs and increased mid frequencies.     Recommendations & Follow-up    Hearing aids(s) are in good working condition. Mr. Schmidt stated that he has no further questions with his hearing aid(s) and does not feel that any other adjustments are needed at this time. He will follow-up in 3 mo., sooner if concerns arise.     It was a pleasure working with Mr. Schmidt today. He was encouraged to contact the clinic with any further questions in the meantime.    Izzy Looney, CCC-A  Clinical Audiologist    [facility]  [facility] AUDIOLOG[facility]  [address] [phone]

## 2024-09-21 DIAGNOSIS — Z00.6 ENCOUNTER FOR EXAMINATION FOR NORMAL COMPARISON OR CONTROL IN CLINICAL RESEARCH PROGRAM: ICD-10-CM

## 2024-10-16 ENCOUNTER — RA CDI HCC (OUTPATIENT)
Dept: OTHER | Facility: HOSPITAL | Age: 64
End: 2024-10-16

## 2024-10-22 ENCOUNTER — OFFICE VISIT (OUTPATIENT)
Dept: FAMILY MEDICINE CLINIC | Facility: CLINIC | Age: 64
End: 2024-10-22
Payer: MEDICARE

## 2024-10-22 VITALS
BODY MASS INDEX: 30.88 KG/M2 | RESPIRATION RATE: 16 BRPM | TEMPERATURE: 97.6 F | WEIGHT: 228 LBS | OXYGEN SATURATION: 98 % | HEART RATE: 81 BPM | SYSTOLIC BLOOD PRESSURE: 130 MMHG | HEIGHT: 72 IN | DIASTOLIC BLOOD PRESSURE: 88 MMHG

## 2024-10-22 DIAGNOSIS — E11.9 TYPE 2 DIABETES MELLITUS WITHOUT COMPLICATION, WITHOUT LONG-TERM CURRENT USE OF INSULIN (HCC): Primary | ICD-10-CM

## 2024-10-22 DIAGNOSIS — R05.1 ACUTE COUGH: ICD-10-CM

## 2024-10-22 DIAGNOSIS — Z23 ENCOUNTER FOR IMMUNIZATION: ICD-10-CM

## 2024-10-22 DIAGNOSIS — I10 PRIMARY HYPERTENSION: ICD-10-CM

## 2024-10-22 LAB — SL AMB POCT HEMOGLOBIN AIC: 6.3 (ref ?–6.5)

## 2024-10-22 PROCEDURE — 83036 HEMOGLOBIN GLYCOSYLATED A1C: CPT | Performed by: NURSE PRACTITIONER

## 2024-10-22 PROCEDURE — 99213 OFFICE O/P EST LOW 20 MIN: CPT | Performed by: NURSE PRACTITIONER

## 2024-10-22 PROCEDURE — G0008 ADMIN INFLUENZA VIRUS VAC: HCPCS

## 2024-10-22 PROCEDURE — 90662 IIV NO PRSV INCREASED AG IM: CPT

## 2024-10-22 NOTE — ASSESSMENT & PLAN NOTE
Pt reports that he is only taking metformin 500 mg daily.  Encourage diabetic diet and exercise as tolerated.   Recheck 6 months.     Lab Results   Component Value Date    HGBA1C 6.3 10/22/2024

## 2024-10-22 NOTE — PROGRESS NOTES
Assessment/Plan:    1. Type 2 diabetes mellitus without complication, without long-term current use of insulin ([facility])  Assessment & Plan:  Pt reports that he is only taking metformin 500 mg daily.  Encourage diabetic diet and exercise as tolerated.   Recheck 6 months.     Lab Results   Component Value Date    HGBA1C 6.3 10/22/2024     Orders:  -     POCT hemoglobin A1c  2. Primary hypertension  Assessment & Plan:  BP wnl in office today.   Continue medications as directed.     3. Encounter for immunization  -     influenza vaccine, high-dose, PF 0.5 mL (Fluzone High Dose)  4. Acute cough  Comments:  S/p covid infection. Pt reports symptom improvement overall. If symptoms persist > 2 weeks, consider CXR for further evaluation.          Return in about 6 months (around 4/22/2025) for Diabetic Follow Up.    Subjective:      Patient ID: Sudeep Schmidt is a 64 y.o. male.    Chief Complaint   Patient presents with    Diabetes     A1C check RH       Pt reports persistent cough since having covid infection 3 months ago. Reports that the cough is incrementally improving. Denies chest pain or shortness of breath.     Diabetes  He presents for his follow-up diabetic visit. He has type 2 diabetes mellitus. His disease course has been stable. Pertinent negatives for diabetes include no chest pain and no fatigue. Risk factors for coronary artery disease include diabetes mellitus, hypertension and male sex. Current diabetic treatment includes oral agent (monotherapy) (pt taking metformin once daily instead of twice daily). He is compliant with treatment some of the time.       The following portions of the patient's history were reviewed and updated as appropriate: allergies, current medications, past family history, past medical history, past social history, past surgical history and problem list.    Review of Systems   Constitutional:  Negative for chills, fatigue and fever.   Respiratory:  Positive for cough. Negative for  shortness of breath.    Cardiovascular:  Negative for chest pain.         Current Outpatient Medications   Medication Sig Dispense Refill    amitriptyline (ELAVIL) 10 mg tablet       atorvastatin (LIPITOR) 10 mg tablet Take 1 tablet (10 mg total) by mouth daily 90 tablet 3    Blood Glucose Monitoring Suppl (OneTouch Verio Reflect) w/Device KIT Check blood sugars once daily. Please substitute with appropriate alternative as covered by patient's insurance. Dx: E11.65 1 kit 0    candesartan-hydrochlorothiazide (ATACAND HCT) 32-12.5 MG per tablet Take 1 tablet by mouth daily 90 tablet 3    glucose blood (OneTouch Verio) test strip Check blood sugars once daily. Please substitute with appropriate alternative as covered by patient's insurance. Dx: E11.65 100 each 3    metFORMIN (GLUCOPHAGE) 500 mg tablet Take 1 tablet (500 mg total) by mouth 2 (two) times a day with meals (Patient taking differently: Take 500 mg by mouth 2 (two) times a day with meals Patient takes one per day) 180 tablet 0    OneTouch Delica Lancets 33G MISC Check blood sugars once daily. Please substitute with appropriate alternative as covered by patient's insurance. Dx: E11.65 100 each 3    Silodosin 8 MG CAPS        No current facility-administered medications for this visit.       Objective:    /88 (BP Location: Left arm, Patient Position: Sitting, Cuff Size: Large)   Pulse 81   Temp 97.6 °F (36.4 °C) (Temporal)   Resp 16   Ht 6' (1.829 m)   Wt 103 kg (228 lb)   SpO2 98%   BMI 30.92 kg/m²        Physical Exam  Vitals reviewed.   Constitutional:       Appearance: Normal appearance.   HENT:      Head: Normocephalic and atraumatic.   Cardiovascular:      Rate and Rhythm: Normal rate and regular rhythm.      Heart sounds: Normal heart sounds.   Pulmonary:      Effort: Pulmonary effort is normal.      Breath sounds: Normal breath sounds.   Neurological:      Mental Status: He is alert and oriented to person, place, and time.   Psychiatric:          Mood and Affect: Mood normal.                ISABEL Ramirez

## 2024-10-22 NOTE — PROGRESS NOTES
Assessment/Plan:    1. Type 2 diabetes mellitus without complication, without long-term current use of insulin ([facility])  -     POCT hemoglobin A1c  2. Encounter for immunization  -     influenza vaccine, high-dose, PF 0.5 mL (Fluzone High Dose)          There are no Patient Instructions on file for this visit.    No follow-ups on file.    Subjective:      Patient ID: Sudeep Schmidt is a 64 y.o. male.    Chief Complaint   Patient presents with    Diabetes     A1C check PROSPER Deutsch is a 64 year old male with diabetes   Cough for 3 months since covid infection, slightly productive. Taking mucinex without improvement.         The following portions of the patient's history were reviewed and updated as appropriate: allergies, current medications, past family history, past medical history, past social history, past surgical history and problem list.    Review of Systems   Constitutional:  Negative for chills, fatigue and fever.   Respiratory:  Positive for cough. Negative for chest tightness and shortness of breath.    Cardiovascular:  Negative for chest pain.         Current Outpatient Medications   Medication Sig Dispense Refill    amitriptyline (ELAVIL) 10 mg tablet       atorvastatin (LIPITOR) 10 mg tablet Take 1 tablet (10 mg total) by mouth daily 90 tablet 3    Blood Glucose Monitoring Suppl (OneTouch Verio Reflect) w/Device KIT Check blood sugars once daily. Please substitute with appropriate alternative as covered by patient's insurance. Dx: E11.65 1 kit 0    candesartan-hydrochlorothiazide (ATACAND HCT) 32-12.5 MG per tablet Take 1 tablet by mouth daily 90 tablet 3    glucose blood (OneTouch Verio) test strip Check blood sugars once daily. Please substitute with appropriate alternative as covered by patient's insurance. Dx: E11.65 100 each 3    metFORMIN (GLUCOPHAGE) 500 mg tablet Take 1 tablet (500 mg total) by mouth 2 (two) times a day with meals (Patient taking differently: Take 500 mg by mouth 2 (two) times  a day with meals Patient takes one per day) 180 tablet 0    OneTouch Delica Lancets 33G MISC Check blood sugars once daily. Please substitute with appropriate alternative as covered by patient's insurance. Dx: E11.65 100 each 3    Silodosin 8 MG CAPS        No current facility-administered medications for this visit.       Objective:    /88 (BP Location: Left arm, Patient Position: Sitting, Cuff Size: Large)   Pulse 81   Temp 97.6 °F (36.4 °C) (Temporal)   Resp 16   Ht 6' (1.829 m)   Wt 103 kg (228 lb)   SpO2 98%   BMI 30.92 kg/m²        Physical Exam           ISABEL Ramirez   FAMILY HISTORY:  No pertinent family history in first degree relatives

## 2024-10-30 DIAGNOSIS — E11.9 TYPE 2 DIABETES MELLITUS WITHOUT COMPLICATION, WITHOUT LONG-TERM CURRENT USE OF INSULIN (HCC): ICD-10-CM

## 2024-11-07 ENCOUNTER — TELEPHONE (OUTPATIENT)
Dept: FAMILY MEDICINE CLINIC | Facility: CLINIC | Age: 64
End: 2024-11-07

## 2024-11-07 DIAGNOSIS — I10 ESSENTIAL HYPERTENSION: ICD-10-CM

## 2024-11-07 RX ORDER — CANDESARTAN CILEXETIL AND HYDROCHLOROTHIAZIDE 32; 12.5 MG/1; MG/1
1 TABLET ORAL DAILY
Qty: 90 TABLET | Refills: 3 | Status: SHIPPED | OUTPATIENT
Start: 2024-11-07

## 2024-11-07 NOTE — TELEPHONE ENCOUNTER
Spoke to [facility].  [facility]    I attached it to this refill and deleted pharmacies that he is not using.

## 2024-11-07 NOTE — TELEPHONE ENCOUNTER
Received a faxed requesting his     Candesartan to be send to [facility]    He had an RX sent to the local pharmacy in July for 1 years worth.      LM message to see if he now wants this [facility].

## 2024-11-12 ENCOUNTER — CLINICAL SUPPORT (OUTPATIENT)
Dept: FAMILY MEDICINE CLINIC | Facility: CLINIC | Age: 64
End: 2024-11-12
Payer: MEDICARE

## 2024-11-12 DIAGNOSIS — Z23 NEED FOR COVID-19 VACCINE: Primary | ICD-10-CM

## 2024-11-12 PROCEDURE — 90480 ADMN SARSCOV2 VAC 1/ONLY CMP: CPT

## 2024-11-12 PROCEDURE — 91320 SARSCV2 VAC 30MCG TRS-SUC IM: CPT

## 2024-11-18 ENCOUNTER — OFFICE VISIT (OUTPATIENT)
Dept: OTOLARYNGOLOGY | Facility: CLINIC | Age: 64
End: 2024-11-18
Payer: MEDICARE

## 2024-11-18 VITALS
HEIGHT: 72 IN | HEART RATE: 73 BPM | OXYGEN SATURATION: 99 % | TEMPERATURE: 98 F | WEIGHT: 228 LBS | BODY MASS INDEX: 30.88 KG/M2

## 2024-11-18 DIAGNOSIS — H90.3 SENSORINEURAL HEARING LOSS (SNHL), BILATERAL: ICD-10-CM

## 2024-11-18 DIAGNOSIS — H93.13 BILATERAL TINNITUS: ICD-10-CM

## 2024-11-18 DIAGNOSIS — H61.23 BILATERAL IMPACTED CERUMEN: Primary | ICD-10-CM

## 2024-11-18 PROCEDURE — 99213 OFFICE O/P EST LOW 20 MIN: CPT | Performed by: NURSE PRACTITIONER

## 2024-11-18 PROCEDURE — 69210 REMOVE IMPACTED EAR WAX UNI: CPT | Performed by: NURSE PRACTITIONER

## 2024-11-18 RX ORDER — FLUOROURACIL 50 MG/G
CREAM TOPICAL
COMMUNITY
Start: 2024-11-11

## 2024-11-18 NOTE — PROGRESS NOTES
Assessment/Plan:      Diagnoses and all orders for this visit:    Bilateral impacted cerumen  -     Ear cerumen removal    Sensorineural hearing loss (SNHL), bilateral    Bilateral tinnitus    Other orders  -     fluorouracil (EFUDEX) 5 % cream; APPLY BY TOPICAL ROUTE 2 TIMES EVERY DAY TO THE SCALY PINK AREAS ON TEMPLES AND CHEEKS FOR 2 WEEKS          On exam noted bilateral cerumen impaction and unable to fully view tympanic membrane.  Cerumen impaction removed bilateral eac with alligator forceps and suction, pt tolerated procedure well.  Upon removal, improved hearing and decreased clogged sensation of bilateral ears.  Discussed routine cerumen care including avoidance of q-tips, may use cerumen softeners every one to two months.  Hydrocortisone cream pea sized amount on finger as needed for itching in ears.  Encourage ongoing follow up annually to monitor for cerumen and hearing.      Audiogram completed 09/2024 indicated bilateral mild sloping to moderate SNHL. Good   Word discrimination. Tymps Type A bilaterally    Typical causes of SNHL include maturity changes, noise exposure, and hereditary risk factors.   Offered options for bilateral SNHL including acceptance, lifestyle changes such as moving closer to those who are speaking, or hearing amplification.  Current binaural hearing aids and states uses them as needed.  Discussed that quality of life may improve with hearing amplification.  Pt will follow up with our office annually       Subjective:     Patient ID: Sudeep Schmidt is a 64 y.o. male.    Presents today for follow up due to ear and hearing concerns.  Since last visit, no significant changes in hearing. Current hearing aids. No otalgia or otorrhea.         Review of Systems   Constitutional: Negative.    HENT:  Positive for hearing loss. Negative for congestion, ear discharge, ear pain, nosebleeds, postnasal drip, rhinorrhea, sinus pressure, sinus pain, sore throat, tinnitus and voice change.     Respiratory:  Negative for chest tightness and shortness of breath.    Skin:  Negative for color change.   Neurological:  Negative for dizziness, numbness and headaches.   Psychiatric/Behavioral: Negative.           Objective:     Physical Exam  Constitutional:       Appearance: He is well-developed.   HENT:      Head: Normocephalic.      Right Ear: Hearing, tympanic membrane, ear canal and external ear normal. No decreased hearing noted. No drainage or tenderness. There is impacted cerumen. Tympanic membrane is not perforated or erythematous.      Left Ear: Hearing, tympanic membrane, ear canal and external ear normal. No decreased hearing noted. No drainage or tenderness. There is impacted cerumen. Tympanic membrane is not perforated or erythematous.      Nose: Nose normal. No nasal deformity or septal deviation.      Mouth/Throat:      Mouth: Mucous membranes are not pale and not dry. No oral lesions.      Dentition: Normal dentition.      Pharynx: Uvula midline. No oropharyngeal exudate.   Neck:      Trachea: No tracheal deviation.   Pulmonary:      Effort: No accessory muscle usage or respiratory distress.   Musculoskeletal:      Cervical back: Neck supple.   Lymphadenopathy:      Cervical: No cervical adenopathy.   Skin:     General: Skin is warm and dry.   Neurological:      Mental Status: He is alert and oriented to person, place, and time.      Cranial Nerves: No cranial nerve deficit.      Sensory: No sensory deficit.   Psychiatric:         Behavior: Behavior is cooperative.         Ear cerumen removal    Date/Time: 11/18/2024 9:30 AM    Performed by: ISABEL Clarke  Authorized by: ISABEL Clarke  Universal Protocol:  procedure performed by consultantConsent: Verbal consent obtained.  Risks and benefits: risks, benefits and alternatives were discussed  Consent given by: patient  Patient understanding: patient states understanding of the procedure being performed    Patient location:   Clinic  Procedure details:     Local anesthetic:  None    Location:  L ear and R ear    Approach:  External  Post-procedure details:     Complication:  None    Hearing quality:  Normal    Patient tolerance of procedure:  Tolerated well, no immediate complications

## 2024-11-25 ENCOUNTER — PATIENT MESSAGE (OUTPATIENT)
Dept: FAMILY MEDICINE CLINIC | Facility: CLINIC | Age: 64
End: 2024-11-25

## 2024-11-25 DIAGNOSIS — R05.1 ACUTE COUGH: Primary | ICD-10-CM

## 2024-11-25 DIAGNOSIS — R05.2 SUBACUTE COUGH: ICD-10-CM

## 2024-12-10 ENCOUNTER — APPOINTMENT (OUTPATIENT)
Dept: LAB | Facility: HOSPITAL | Age: 64
End: 2024-12-10
Payer: MEDICARE

## 2024-12-10 ENCOUNTER — HOSPITAL ENCOUNTER (OUTPATIENT)
Dept: RADIOLOGY | Facility: HOSPITAL | Age: 64
Discharge: HOME | End: 2024-12-10
Payer: MEDICARE

## 2024-12-10 ENCOUNTER — RESULTS FOLLOW-UP (OUTPATIENT)
Dept: FAMILY MEDICINE CLINIC | Facility: CLINIC | Age: 64
End: 2024-12-10

## 2024-12-10 DIAGNOSIS — R05.2 SUBACUTE COUGH: ICD-10-CM

## 2024-12-10 PROCEDURE — 71046 X-RAY EXAM CHEST 2 VIEWS: CPT

## 2025-02-03 ENCOUNTER — OFFICE VISIT (OUTPATIENT)
Dept: AUDIOLOGY | Facility: CLINIC | Age: 65
End: 2025-02-03

## 2025-02-03 DIAGNOSIS — H90.3 SENSORINEURAL HEARING LOSS, BILATERAL: Primary | ICD-10-CM

## 2025-02-03 NOTE — PROGRESS NOTES
Hearing Aid Visit:    Name:  Sudeep Schmidt  :  1960  Age:  64 y.o.  MRN:  [id number]  Date of Evaluation: 25     HISTORY:    Sudeep Schmidt was seen today (2/3/2025) for a(n) in-warranty hearing aid check of his bilateral hearing aids. Today, Sudeep reports that he is doing well overall but still struggles to hear is wife occasionally.     DEVICE INFORMATION:    Hearing Aid Fitting Date: 2023     Left Device Right Device   Hearing Aid Make: Oticon Oticon   Hearing Aid Model: REAL 1-R REAL 1-R   Serial Number: [id number]   Repair Warranty Expiration Date: 2026   Loss/Damage Warranty Expiration Date:  2026    Length: 3 3    Output: 85dB 85dB   Wax System: Integrated biometrics miniFIT ProWax miniFIT   Dome Size/Style: 10 mm open 10mm open   Battery: Lithium-ion Rechargeable Lithium-ion Rechargeable   Earmold Serial Number: N/A N/A   Earmold Warranty Date:  N/A N/A      Serial Number: [id number]  Accessories: N/A      ACTION/ADJUSTMENTS:    Hearing aids cleaned and checked. Domes and wax filters changed and microphones vacuumed  Increased mid frequency gain for better sound quality and improved speech understanding.     RECOMMENDATIONS:     Return for routine hearing aid maintenance in 3 (25) or sooner if problems or concerns arise.       Trinity Looney., CCC-A  Clinical Audiologist  [facility] AUDIOLOGY  [address] [address]

## 2025-04-07 ENCOUNTER — APPOINTMENT (OUTPATIENT)
Dept: RADIOLOGY | Facility: CLINIC | Age: 65
End: 2025-04-07
Payer: MEDICARE

## 2025-04-07 DIAGNOSIS — N20.0 RENAL CALCULUS: ICD-10-CM

## 2025-04-07 PROCEDURE — 74018 RADEX ABDOMEN 1 VIEW: CPT

## 2025-04-21 RX ORDER — FINASTERIDE 5 MG/1
5 TABLET, FILM COATED ORAL DAILY
COMMUNITY
Start: 2024-12-01

## 2025-04-22 ENCOUNTER — OFFICE VISIT (OUTPATIENT)
Dept: FAMILY MEDICINE CLINIC | Facility: CLINIC | Age: 65
End: 2025-04-22
Payer: MEDICARE

## 2025-04-22 ENCOUNTER — TELEPHONE (OUTPATIENT)
Age: 65
End: 2025-04-22

## 2025-04-22 VITALS
DIASTOLIC BLOOD PRESSURE: 72 MMHG | BODY MASS INDEX: 30.75 KG/M2 | OXYGEN SATURATION: 98 % | TEMPERATURE: 96.9 F | RESPIRATION RATE: 16 BRPM | HEART RATE: 90 BPM | SYSTOLIC BLOOD PRESSURE: 128 MMHG | WEIGHT: 227 LBS | HEIGHT: 72 IN

## 2025-04-22 DIAGNOSIS — N40.1 BENIGN PROSTATIC HYPERPLASIA WITH INCOMPLETE BLADDER EMPTYING: ICD-10-CM

## 2025-04-22 DIAGNOSIS — E78.49 FAMILIAL HYPERLIPIDEMIA: ICD-10-CM

## 2025-04-22 DIAGNOSIS — T75.3XXA MOTION SICKNESS, INITIAL ENCOUNTER: ICD-10-CM

## 2025-04-22 DIAGNOSIS — Z13.79 GENETIC TESTING: ICD-10-CM

## 2025-04-22 DIAGNOSIS — E11.9 TYPE 2 DIABETES MELLITUS WITHOUT COMPLICATION, WITHOUT LONG-TERM CURRENT USE OF INSULIN (HCC): Primary | ICD-10-CM

## 2025-04-22 DIAGNOSIS — R39.14 BENIGN PROSTATIC HYPERPLASIA WITH INCOMPLETE BLADDER EMPTYING: ICD-10-CM

## 2025-04-22 DIAGNOSIS — I10 PRIMARY HYPERTENSION: ICD-10-CM

## 2025-04-22 LAB — SL AMB POCT HEMOGLOBIN AIC: 6.7 (ref ?–6.5)

## 2025-04-22 PROCEDURE — G2211 COMPLEX E/M VISIT ADD ON: HCPCS | Performed by: STUDENT IN AN ORGANIZED HEALTH CARE EDUCATION/TRAINING PROGRAM

## 2025-04-22 PROCEDURE — 99214 OFFICE O/P EST MOD 30 MIN: CPT | Performed by: STUDENT IN AN ORGANIZED HEALTH CARE EDUCATION/TRAINING PROGRAM

## 2025-04-22 PROCEDURE — 83036 HEMOGLOBIN GLYCOSYLATED A1C: CPT | Performed by: STUDENT IN AN ORGANIZED HEALTH CARE EDUCATION/TRAINING PROGRAM

## 2025-04-22 RX ORDER — SCOPOLAMINE 1 MG/3D
1 PATCH, EXTENDED RELEASE TRANSDERMAL
Qty: 10 PATCH | Refills: 0 | Status: SHIPPED | OUTPATIENT
Start: 2025-04-22

## 2025-04-22 NOTE — PROGRESS NOTES
Name: Sudeep Schmidt      : 1960      MRN: [id number]  Encounter Provider: Olivia Aparicio MD  Encounter Date: 2025   Encounter department: [facility]  :  Assessment & Plan  Type 2 diabetes mellitus without complication, without long-term current use of insulin (HCC)    Lab Results   Component Value Date    HGBA1C 6.7 (A) 2025     -Continue metformin 500 mg daily  Orders:  •  POCT hemoglobin A1c  •  Hemoglobin A1C; Future    Primary hypertension  -/72  -Continue candesartan-HCTZ 32-12.5 mg daily       Familial hyperlipidemia  -Continue Lipitor 10 mg daily  Orders:  •  Lipid Panel with Direct LDL reflex; Future  •  Comprehensive metabolic panel; Future    Motion sickness, initial encounter    Orders:  •  scopolamine (TRANSDERM-SCOP) 1 mg/3 days TD 72 hr patch; Place 1 patch on the skin over 72 hours every third day    Benign prostatic hyperplasia with incomplete bladder emptying    Orders:  •  PSA (Reflex To Free) (Serial); Future    Genetic testing    Orders:  •  [facility] Hereditary Multi-Cancer Panel; Future           History of Present Illness   HPI    Patient presents for A1c check. Notes he has been taking metformin once daily with meal. He is going on a cruise and notes he may experience motion sickness. He is compliant with all medications. He has follow up scheduled for podiatry and eye doctor.     Review of Systems   Constitutional:  Negative for activity change, appetite change, chills, fatigue and fever.   HENT:  Negative for congestion.    Respiratory:  Negative for cough, shortness of breath and wheezing.    Cardiovascular:  Negative for chest pain, palpitations and leg swelling.   Gastrointestinal:  Negative for abdominal pain, constipation, diarrhea, nausea and vomiting.   Skin:  Negative for rash.   Neurological:  Negative for light-headedness and headaches.   Psychiatric/Behavioral:  The patient is not nervous/anxious.        Objective   /72    Pulse 90   Temp (!) 96.9 °F (36.1 °C) (Temporal)   Resp 16   Ht 6' (1.829 m)   Wt 103 kg (227 lb)   SpO2 98%   BMI 30.79 kg/m²      Physical Exam  Constitutional:       Appearance: Normal appearance.   HENT:      Head: Normocephalic and atraumatic.   Cardiovascular:      Rate and Rhythm: Normal rate and regular rhythm.      Pulses: Normal pulses.      Heart sounds: Normal heart sounds.   Pulmonary:      Effort: Pulmonary effort is normal.      Breath sounds: Normal breath sounds.   Neurological:      General: No focal deficit present.      Mental Status: He is alert and oriented to person, place, and time.   Psychiatric:         Mood and Affect: Mood normal.         Behavior: Behavior normal.         Thought Content: Thought content normal.         Judgment: Judgment normal.

## 2025-04-22 NOTE — TELEPHONE ENCOUNTER
PA Scopolamine 1MG/3DAYS 72 hr patches SUBMITTED    to [facility]      via    [x]CMM-KEY: [id number]  []Surescripts-Case ID #    []Availity-Auth ID #  NDC #    []Faxed to plan   []Other website    []Phone call Case ID #      []PA sent as URGENT    All office notes, labs and other pertaining documents and studies sent. Clinical questions answered. Awaiting determination from insurance company.     Turnaround time for your insurance to make a decision on your Prior Authorization can take 7-21 business days.

## 2025-04-22 NOTE — ASSESSMENT & PLAN NOTE
-Continue Lipitor 10 mg daily  Orders:  •  Lipid Panel with Direct LDL reflex; Future  •  Comprehensive metabolic panel; Future

## 2025-04-22 NOTE — ASSESSMENT & PLAN NOTE
Lab Results   Component Value Date    HGBA1C 6.7 (A) 04/22/2025     -Continue metformin 500 mg daily  Orders:  •  POCT hemoglobin A1c  •  Hemoglobin A1C; Future

## 2025-05-20 DIAGNOSIS — T75.3XXA MOTION SICKNESS, INITIAL ENCOUNTER: ICD-10-CM

## 2025-05-21 RX ORDER — SCOPOLAMINE 1 MG/3D
PATCH, EXTENDED RELEASE TRANSDERMAL
Qty: 10 PATCH | Refills: 0 | Status: SHIPPED | OUTPATIENT
Start: 2025-05-21

## 2025-05-21 NOTE — PROGRESS NOTES
Hearing Aid Visit:    Name:  Sudeep Schmidt  :  1960  Age:  64 y.o.  MRN:  [id number]  Date of Evaluation: 25     HISTORY:    Sudeep Schmidt was seen today (2025) for a(n) in-warranty hearing aid check of his bilateral hearing aids. Today, Sudeep reports he hasn't had much of a chance to wear them as he was away on a trip without them, but they seem to be working well.    DEVICE INFORMATION:    Hearing Aid Fitting Date: 2023     Left Device Right Device   Hearing Aid Make: OtSundaySky OtSundaySky   Hearing Aid Model: REAL 1-R REAL 1-R   Serial Number: [id number]   Repair Warranty Expiration Date: 2026   Loss/Damage Warranty Expiration Date:  2026    Length: 3 3    Output: 85dB 85dB   Wax System: [email or web address] miniFIT   Dome Size/Style: 10 mm open 10mm open   Battery: Lithium-ion Rechargeable Lithium-ion Rechargeable   Earmold Serial Number: N/A N/A   Earmold Warranty Date:  N/A N/A      Serial Number: [id number]  Accessories: N/A      ACTION/ADJUSTMENTS:    Hearing aids cleaned and checked. Domes and wax filters changed and microphones vacuumed  Installed firmware update in aids  Changed right  as the support cap was loose     RECOMMENDATIONS:     Return for routine hearing aid maintenance in about 3 month(s) (25) or sooner if problems or concerns arise.      Trinity Looney., CCC-A  Clinical Audiologist [id number]  [facility] AUDIOLOGY  [address] [address]

## 2025-05-22 ENCOUNTER — OFFICE VISIT (OUTPATIENT)
Dept: AUDIOLOGY | Facility: CLINIC | Age: 65
End: 2025-05-22

## 2025-05-22 DIAGNOSIS — H90.3 SENSORINEURAL HEARING LOSS, BILATERAL: Primary | ICD-10-CM

## 2025-07-14 ENCOUNTER — OFFICE VISIT (OUTPATIENT)
Age: 65
End: 2025-07-14
Payer: MEDICARE

## 2025-07-14 ENCOUNTER — APPOINTMENT (OUTPATIENT)
Dept: LAB | Facility: CLINIC | Age: 65
End: 2025-07-14
Payer: MEDICARE

## 2025-07-14 VITALS — BODY MASS INDEX: 30.75 KG/M2 | HEIGHT: 72 IN | WEIGHT: 227 LBS

## 2025-07-14 DIAGNOSIS — Z00.6 ENCOUNTER FOR EXAMINATION FOR NORMAL COMPARISON OR CONTROL IN CLINICAL RESEARCH PROGRAM: ICD-10-CM

## 2025-07-14 DIAGNOSIS — E11.9 TYPE 2 DIABETES MELLITUS WITHOUT COMPLICATION, WITHOUT LONG-TERM CURRENT USE OF INSULIN (HCC): Primary | ICD-10-CM

## 2025-07-14 DIAGNOSIS — Z13.79 GENETIC TESTING: ICD-10-CM

## 2025-07-14 PROCEDURE — 99213 OFFICE O/P EST LOW 20 MIN: CPT | Performed by: STUDENT IN AN ORGANIZED HEALTH CARE EDUCATION/TRAINING PROGRAM

## 2025-07-14 NOTE — PROGRESS NOTES
Name: Sudeep Schmidt      : 1960      MRN: [id number]  Encounter Provider: Jack Nielson DPM  Encounter Date: 2025   Encounter department: [facility] [place]  :  Assessment & Plan  Type 2 diabetes mellitus without complication, without long-term current use of insulin ([facility])    Lab Results   Component Value Date    HGBA1C 6.7 (A) 2025          -Patient and I discussed their foot in detail  -Get and I discussed his bilateral feet in regard to his type 2 diabetes.  I did instruct him to wear supportive sneakers with over-the-counter inserts, white-colored socks so as to notice blood, self examine his feet daily, maintain tight glycemic control, and refrain from walking barefoot at all times.  -Most recent hemoglobin A1c from 2025 reviewed: 6.7%  -Return to clinic 1 year for diabetic foot examination      History of Present Illness   Sudeep is a pleasant 63-year-old male with a past medical history significant for hypertension, hyperlipidemia, type 2 diabetes.  He also has not yet had a diabetic foot examination performed this year and would like to begin annual examinations.     Review of Systems   Constitutional: Negative.    Respiratory: Negative.     Cardiovascular: Negative.    Gastrointestinal: Negative.    Genitourinary: Negative.    Musculoskeletal: Negative.    Skin: Negative.           Objective   Ht 6' (1.829 m)   Wt 103 kg (227 lb)   BMI 30.79 kg/m²      Physical Exam  Constitutional:       Appearance: He is not ill-appearing.     Cardiovascular:      Rate and Rhythm: Normal rate.      Pulses: no weak pulses.           Dorsalis pedis pulses are 2+ on the right side and 2+ on the left side.        Posterior tibial pulses are 2+ on the right side and 2+ on the left side.   Pulmonary:      Effort: No respiratory distress.   Feet:      Right foot:      Skin integrity: No ulcer, skin breakdown, erythema, warmth, callus or dry skin.      Left foot:      Skin  integrity: No ulcer, skin breakdown, erythema, warmth, callus or dry skin.     Neurological:      Mental Status: He is alert and oriented to person, place, and time.      Sensory: No sensory deficit.     Patient's shoes and socks removed.    Right Foot/Ankle   Right Foot Inspection  Skin Exam: skin normal and skin intact. No dry skin, no warmth, no callus, no erythema, no maceration, no abnormal color, no pre-ulcer, no ulcer and no callus.     Toe Exam: ROM and strength within normal limits. No swelling, no tenderness, erythema and  no right toe deformity    Sensory   Monofilament testing: intact    Vascular  Capillary refills: < 3 seconds  The right DP pulse is 2+. The right PT pulse is 2+.     Right Toe  - Comprehensive Exam  Ecchymosis: none  Arch: normal  Hammertoes: absent  Claw Toes: absent  Swelling: none   Tenderness: none       Left Foot/Ankle  Left Foot Inspection  Skin Exam: skin normal and skin intact. No dry skin, no warmth, no erythema, no maceration, normal color, no pre-ulcer, no ulcer and no callus.     Toe Exam: ROM and strength within normal limits. No swelling, no tenderness, no erythema and no left toe deformity.     Sensory   Monofilament testing: intact    Vascular  Capillary refills: < 3 seconds  The left DP pulse is 2+. The left PT pulse is 2+.     Left Toe  - Comprehensive Exam  Ecchymosis: none  Arch: normal  Hammertoes: absent  Claw toes: absent  Swelling: none   Tenderness: none       Assign Risk Category  No deformity present  No loss of protective sensation  No weak pulses  Risk: 0

## 2025-07-14 NOTE — ASSESSMENT & PLAN NOTE
Lab Results   Component Value Date    HGBA1C 6.7 (A) 04/22/2025          -Patient and I discussed their foot in detail  -Get and I discussed his bilateral feet in regard to his type 2 diabetes.  I did instruct him to wear supportive sneakers with over-the-counter inserts, white-colored socks so as to notice blood, self examine his feet daily, maintain tight glycemic control, and refrain from walking barefoot at all times.  -Most recent hemoglobin A1c from April 22, 2025 reviewed: 6.7%  -Return to clinic 1 year for diabetic foot examination

## 2025-07-21 LAB
APOB+LDLR+PCSK9 GENE MUT ANL BLD/T: NOT DETECTED
BRCA1+BRCA2 DEL+DUP + FULL MUT ANL BLD/T: NOT DETECTED
MLH1+MSH2+MSH6+PMS2 GN DEL+DUP+FUL M: NOT DETECTED

## 2025-07-22 DIAGNOSIS — E11.9 TYPE 2 DIABETES MELLITUS WITHOUT COMPLICATION, WITHOUT LONG-TERM CURRENT USE OF INSULIN (HCC): ICD-10-CM

## 2025-07-23 ENCOUNTER — OFFICE VISIT (OUTPATIENT)
Dept: FAMILY MEDICINE CLINIC | Facility: CLINIC | Age: 65
End: 2025-07-23
Payer: MEDICARE

## 2025-07-23 ENCOUNTER — APPOINTMENT (OUTPATIENT)
Dept: RADIOLOGY | Facility: CLINIC | Age: 65
End: 2025-07-23
Attending: STUDENT IN AN ORGANIZED HEALTH CARE EDUCATION/TRAINING PROGRAM
Payer: MEDICARE

## 2025-07-23 VITALS
OXYGEN SATURATION: 96 % | HEART RATE: 96 BPM | RESPIRATION RATE: 16 BRPM | HEIGHT: 72 IN | BODY MASS INDEX: 30.2 KG/M2 | TEMPERATURE: 97.5 F | SYSTOLIC BLOOD PRESSURE: 140 MMHG | DIASTOLIC BLOOD PRESSURE: 88 MMHG | WEIGHT: 223 LBS

## 2025-07-23 DIAGNOSIS — E11.9 TYPE 2 DIABETES MELLITUS WITHOUT COMPLICATION, WITHOUT LONG-TERM CURRENT USE OF INSULIN (HCC): ICD-10-CM

## 2025-07-23 DIAGNOSIS — E78.5 HYPERLIPIDEMIA, UNSPECIFIED HYPERLIPIDEMIA TYPE: ICD-10-CM

## 2025-07-23 DIAGNOSIS — M25.561 CHRONIC PAIN OF RIGHT KNEE: ICD-10-CM

## 2025-07-23 DIAGNOSIS — G89.29 CHRONIC PAIN OF RIGHT KNEE: ICD-10-CM

## 2025-07-23 DIAGNOSIS — Z00.00 MEDICARE ANNUAL WELLNESS VISIT, INITIAL: Primary | ICD-10-CM

## 2025-07-23 DIAGNOSIS — I10 PRIMARY HYPERTENSION: ICD-10-CM

## 2025-07-23 PROCEDURE — 73562 X-RAY EXAM OF KNEE 3: CPT

## 2025-07-23 PROCEDURE — G2211 COMPLEX E/M VISIT ADD ON: HCPCS | Performed by: STUDENT IN AN ORGANIZED HEALTH CARE EDUCATION/TRAINING PROGRAM

## 2025-07-23 PROCEDURE — 99214 OFFICE O/P EST MOD 30 MIN: CPT | Performed by: STUDENT IN AN ORGANIZED HEALTH CARE EDUCATION/TRAINING PROGRAM

## 2025-07-23 PROCEDURE — G0439 PPPS, SUBSEQ VISIT: HCPCS | Performed by: STUDENT IN AN ORGANIZED HEALTH CARE EDUCATION/TRAINING PROGRAM

## 2025-07-23 RX ORDER — TRIAMCINOLONE ACETONIDE 1 MG/G
CREAM TOPICAL 2 TIMES DAILY
COMMUNITY
Start: 2025-05-23

## 2025-07-23 RX ORDER — AMOXICILLIN 500 MG/1
CAPSULE ORAL
COMMUNITY
Start: 2025-07-22

## 2025-07-23 NOTE — ASSESSMENT & PLAN NOTE
-/88  -Elevated most likely secondary to pain from root canal yesterday  -Continue Atacand HCT 32-12.5 mg daily

## 2025-07-23 NOTE — ASSESSMENT & PLAN NOTE
Lab Results   Component Value Date    HGBA1C 6.8 (H) 07/14/2025       Orders:  •  Hemoglobin A1C; Future  •  Albumin / creatinine urine ratio; Future  •  metFORMIN (GLUCOPHAGE) 500 mg tablet; Take 1 tablet (500 mg total) by mouth daily with breakfast

## 2025-07-23 NOTE — PATIENT INSTRUCTIONS
Medicare Preventive Visit Patient Instructions  Thank you for completing your Welcome to Medicare Visit or Medicare Annual Wellness Visit today. Your next wellness visit will be due in one year (7/24/2026).  The screening/preventive services that you may require over the next 5-10 years are detailed below. Some tests may not apply to you based off risk factors and/or age. Screening tests ordered at today's visit but not completed yet may show as past due. Also, please note that scanned in results may not display below.  Preventive Screenings:  Service Recommendations Previous Testing/Comments   Colorectal Cancer Screening  Colonoscopy    Fecal Occult Blood Test (FOBT)/Fecal Immunochemical Test (FIT)  Fecal DNA/Cologuard Test  Flexible Sigmoidoscopy Age: 45-75 years old   Colonoscopy: every 10 years (May be performed more frequently if at higher risk)  OR  FOBT/FIT: every 1 year  OR  Cologuard: every 3 years  OR  Sigmoidoscopy: every 5 years  Screening may be recommended earlier than age 45 if at higher risk for colorectal cancer. Also, an individualized decision between you and your healthcare provider will decide whether screening between the ages of 76-85 would be appropriate. Colonoscopy: 07/08/2021  FOBT/FIT: Not on file  Cologuard: Not on file  Sigmoidoscopy: Not on file    Screening Current     Prostate Cancer Screening Individualized decision between patient and health care provider in men between ages of 55-69   Medicare will cover every 12 months beginning on the day after your 50th birthday PSA: 0.641 ng/mL     Screening Current     Hepatitis C Screening Once for adults born between 1945 and 1965  More frequently in patients at high risk for Hepatitis C Hep C Antibody: 06/10/2020    Screening Current   Diabetes Screening 1-2 times per year if you're at risk for diabetes or have pre-diabetes Fasting glucose: 117 mg/dL (7/14/2025)  A1C: 6.8 % (7/14/2025)  Screening Not Indicated  History Diabetes    Cholesterol Screening Once every 5 years if you don't have a lipid disorder. May order more often based on risk factors. Lipid panel: 07/14/2025  Screening Not Indicated  History Lipid Disorder      Other Preventive Screenings Covered by Medicare:  Abdominal Aortic Aneurysm (AAA) Screening: covered once if your at risk. You're considered to be at risk if you have a family history of AAA or a male between the age of 65-75 who smoking at least 100 cigarettes in your lifetime.  Lung Cancer Screening: covers low dose CT scan once per year if you meet all of the following conditions: (1) Age 55-77; (2) No signs or symptoms of lung cancer; (3) Current smoker or have quit smoking within the last 15 years; (4) You have a tobacco smoking history of at least 20 pack years (packs per day x number of years you smoked); (5) You get a written order from a healthcare provider.  Glaucoma Screening: covered annually if you're considered high risk: (1) You have diabetes OR (2) Family history of glaucoma OR (3)  aged 50 and older OR (4)  American aged 65 and older  Osteoporosis Screening: covered every 2 years if you meet one of the following conditions: (1) Have a vertebral abnormality; (2) On glucocorticoid therapy for more than 3 months; (3) Have primary hyperparathyroidism; (4) On osteoporosis medications and need to assess response to drug therapy.  HIV Screening: covered annually if you're between the age of 15-65. Also covered annually if you are younger than 15 and older than 65 with risk factors for HIV infection. For pregnant patients, it is covered up to 3 times per pregnancy.    Immunizations:  Immunization Recommendations   Influenza Vaccine Annual influenza vaccination during flu season is recommended for all persons aged >= 6 months who do not have contraindications   Pneumococcal Vaccine   * Pneumococcal conjugate vaccine = PCV13 (Prevnar 13), PCV15 (Vaxneuvance), PCV20 (Prevnar 20)  *  Pneumococcal polysaccharide vaccine = PPSV23 (Pneumovax) Adults 19-65 yo with certain risk factors or if 65+ yo  If never received any pneumonia vaccine: recommend Prevnar 20 (PCV20)  Give PCV20 if previously received 1 dose of PCV13 or PPSV23   Hepatitis B Vaccine 3 dose series if at intermediate or high risk (ex: diabetes, end stage renal disease, liver disease)   Respiratory syncytial virus (RSV) Vaccine - COVERED BY MEDICARE PART D  * RSVPreF3 (Arexvy) CDC recommends that adults 60 years of age and older may receive a single dose of RSV vaccine using shared clinical decision-making (SCDM)   Tetanus (Td) Vaccine - COST NOT COVERED BY MEDICARE PART B Following completion of primary series, a booster dose should be given every 10 years to maintain immunity against tetanus. Td may also be given as tetanus wound prophylaxis.   Tdap Vaccine - COST NOT COVERED BY MEDICARE PART B Recommended at least once for all adults. For pregnant patients, recommended with each pregnancy.   Shingles Vaccine (Shingrix) - COST NOT COVERED BY MEDICARE PART B  2 shot series recommended in those 19 years and older who have or will have weakened immune systems or those 50 years and older     Health Maintenance Due:      Topic Date Due   • Colorectal Cancer Screening  07/08/2026   • Hepatitis C Screening  Completed     Immunizations Due:      Topic Date Due   • COVID-19 Vaccine (8 - 2024-25 season) 05/12/2025   • Influenza Vaccine (1) 09/01/2025     Advance Directives   What are advance directives?  Advance directives are legal documents that state your wishes and plans for medical care. These plans are made ahead of time in case you lose your ability to make decisions for yourself. Advance directives can apply to any medical decision, such as the treatments you want, and if you want to donate organs.   What are the types of advance directives?  There are many types of advance directives, and each state has rules about how to use them. You  may choose a combination of any of the following:  Living will:  This is a written record of the treatment you want. You can also choose which treatments you do not want, which to limit, and which to stop at a certain time. This includes surgery, medicine, IV fluid, and tube feedings.   Durable power of  for healthcare (DPAHC):  This is a written record that states who you want to make healthcare choices for you when you are unable to make them for yourself. This person, called a proxy, is usually a family member or a friend. You may choose more than 1 proxy.  Do not resuscitate (DNR) order:  A DNR order is used in case your heart stops beating or you stop breathing. It is a request not to have certain forms of treatment, such as CPR. A DNR order may be included in other types of advance directives.  Medical directive:  This covers the care that you want if you are in a coma, near death, or unable to make decisions for yourself. You can list the treatments you want for each condition. Treatment may include pain medicine, surgery, blood transfusions, dialysis, IV or tube feedings, and a ventilator (breathing machine).  Values history:  This document has questions about your views, beliefs, and how you feel and think about life. This information can help others choose the care that you would choose.  Why are advance directives important?  An advance directive helps you control your care. Although spoken wishes may be used, it is better to have your wishes written down. Spoken wishes can be misunderstood, or not followed. Treatments may be given even if you do not want them. An advance directive may make it easier for your family to make difficult choices about your care.   Weight Management   Why it is important to manage your weight:  Being overweight increases your risk of health conditions such as heart disease, high blood pressure, type 2 diabetes, and certain types of cancer. It can also increase your  risk for osteoarthritis, sleep apnea, and other respiratory problems. Aim for a slow, steady weight loss. Even a small amount of weight loss can lower your risk of health problems.  How to lose weight safely:  A safe and healthy way to lose weight is to eat fewer calories and get regular exercise. You can lose up about 1 pound a week by decreasing the number of calories you eat by 500 calories each day.   Healthy meal plan for weight management:  A healthy meal plan includes a variety of foods, contains fewer calories, and helps you stay healthy. A healthy meal plan includes the following:  Eat whole-grain foods more often.  A healthy meal plan should contain fiber. Fiber is the part of grains, fruits, and vegetables that is not broken down by your body. Whole-grain foods are healthy and provide extra fiber in your diet. Some examples of whole-grain foods are whole-wheat breads and pastas, oatmeal, brown rice, and bulgur.  Eat a variety of vegetables every day.  Include dark, leafy greens such as spinach, kale, terrence greens, and mustard greens. Eat yellow and orange vegetables such as carrots, sweet potatoes, and winter squash.   Eat a variety of fruits every day.  Choose fresh or canned fruit (canned in its own juice or light syrup) instead of juice. Fruit juice has very little or no fiber.  Eat low-fat dairy foods.  Drink fat-free (skim) milk or 1% milk. Eat fat-free yogurt and low-fat cottage cheese. Try low-fat cheeses such as mozzarella and other reduced-fat cheeses.  Choose meat and other protein foods that are low in fat.  Choose beans or other legumes such as split peas or lentils. Choose fish, skinless poultry (chicken or turkey), or lean cuts of red meat (beef or pork). Before you cook meat or poultry, cut off any visible fat.   Use less fat and oil.  Try baking foods instead of frying them. Add less fat, such as margarine, sour cream, regular salad dressing and mayonnaise to foods. Eat fewer high-fat  "foods. Some examples of high-fat foods include french fries, doughnuts, ice cream, and cakes.  Eat fewer sweets.  Limit foods and drinks that are high in sugar. This includes candy, cookies, regular soda, and sweetened drinks.  Exercise:  Exercise at least 30 minutes per day on most days of the week. Some examples of exercise include walking, biking, dancing, and swimming. You can also fit in more physical activity by taking the stairs instead of the elevator or parking farther away from stores. Ask your healthcare provider about the best exercise plan for you.   Alcohol Use and Your Health    Drinking too much can harm your health.  Excessive alcohol use leads to about 88,000 death in the [place] each year, and shortens the life of those who diet by almost 30 years.  Further, excessive drinking cost the economy $249 billion in 2010.  Most excessive drinkers are not alcohol dependent.    Excessive alcohol use has immediate effects that increase the risk of many harmful health conditions.  These are most often the result of binge drinking.  Over time, excessive alcohol use can lead to the development of chronic diseases and other series health problems.    What is considered a \"drink\"?        Excessive alcohol use includes:  Binge Drinking: For women, 4 or more drinks consumed on one occasion. For men, 5 or more drinks consumed on one occasion.  Heavy Drinking: For women, 8 or more drinks per week. For men, 15 or more drinks per week  Any alcohol used by pregnant women  Any alcohol used by those under the age of 21 years    If you choose to drink, do so in moderation:  Do not drink at all if you are under the age of 21, or if you are or may be pregnant, or have health problems that could be made worse by drinking.  For women, up to 1 drink per day  For men, up to 2 drinks a day    No one should begin drinking or drink more frequently based on potential health benefits    Short-Term Health Risks:  Injuries: " motor vehicle crashes, falls, drownings, burns  Violence: homicide, suicide, sexual assault, intimate partner violence  Alcohol poisoning  Reproductive health: risky sexual behaviors, unintended prengnacy, sexually transmitted diseases, miscarriage, stillbirth, fetal alcohol syndrome    Long-Term Health Risks:  Chronic diseases: high blood pressure, heart disease, stroke, liver disease, digestive problems  Cancers: breast, mouth and throat, liver, colon  Learning and memory problems: dementia, poor school performance  Mental health: depression, anxiety, insomnia  Social problems: lost productivity, family problems, unemployment  Alcohol dependence    For support and more information:  Substance Abuse and Mental Health Services Administration  [address]  [place] [address]  Web Address: http://www.samhsa.gov    Alcoholics Anonymous        Web Address: http://www.aa.org    https://www.cdc.gov/alcohol/fact-sheets/alcohol-use.htm   © Copyright [employer] 2018 Information is for End User's use only and may not be sold, redistributed or otherwise used for commercial purposes. All illustrations and images included in CareNotes® are the copyrighted property of [employer] or [employer]

## 2025-07-24 LAB
GENE DIS ANL INTERP-IMP: NORMAL
GENES TESTED: NORMAL
INTERPRETATION METHODS AND LIMITATIONS: NORMAL
Lab: NORMAL
SEQUENCING LOCATION: NORMAL

## 2025-08-07 DIAGNOSIS — E78.49 FAMILIAL HYPERLIPIDEMIA: ICD-10-CM

## 2025-08-07 RX ORDER — ATORVASTATIN CALCIUM 10 MG/1
10 TABLET, FILM COATED ORAL DAILY
Qty: 90 TABLET | Refills: 3 | Status: SHIPPED | OUTPATIENT
Start: 2025-08-07

## 2025-08-20 ENCOUNTER — OFFICE VISIT (OUTPATIENT)
Dept: AUDIOLOGY | Facility: CLINIC | Age: 65
End: 2025-08-20

## 2025-08-20 DIAGNOSIS — H90.3 SENSORINEURAL HEARING LOSS, BILATERAL: Primary | ICD-10-CM
